# Patient Record
Sex: FEMALE | Race: WHITE | NOT HISPANIC OR LATINO | Employment: OTHER | URBAN - NONMETROPOLITAN AREA
[De-identification: names, ages, dates, MRNs, and addresses within clinical notes are randomized per-mention and may not be internally consistent; named-entity substitution may affect disease eponyms.]

---

## 2017-01-30 ENCOUNTER — TRANSCRIBE ORDERS (OUTPATIENT)
Dept: ADMINISTRATIVE | Facility: HOSPITAL | Age: 72
End: 2017-01-30

## 2017-01-30 ENCOUNTER — APPOINTMENT (OUTPATIENT)
Dept: LAB | Facility: HOSPITAL | Age: 72
End: 2017-01-30
Attending: DERMATOLOGY

## 2017-01-30 DIAGNOSIS — B35.1 TINEA UNGUIUM: ICD-10-CM

## 2017-01-30 DIAGNOSIS — L73.8 OTHER SPECIFIED FOLLICULAR DISORDERS: Primary | ICD-10-CM

## 2017-01-30 LAB — KOH PREP NAIL: NORMAL

## 2017-01-30 PROCEDURE — 87220 TISSUE EXAM FOR FUNGI: CPT | Performed by: DERMATOLOGY

## 2017-01-30 PROCEDURE — 87102 FUNGUS ISOLATION CULTURE: CPT | Performed by: DERMATOLOGY

## 2017-03-12 LAB — FUNGUS WND CULT: NORMAL

## 2018-09-27 ENCOUNTER — TRANSCRIBE ORDERS (OUTPATIENT)
Dept: ADMINISTRATIVE | Facility: HOSPITAL | Age: 73
End: 2018-09-27

## 2018-09-27 DIAGNOSIS — I48.0 PAROXYSMAL ATRIAL FIBRILLATION (HCC): Primary | ICD-10-CM

## 2018-10-04 ENCOUNTER — HOSPITAL ENCOUNTER (OUTPATIENT)
Dept: CARDIOLOGY | Facility: HOSPITAL | Age: 73
Discharge: HOME | End: 2018-10-04
Attending: INTERNAL MEDICINE | Admitting: INTERNAL MEDICINE

## 2018-10-04 VITALS — BODY MASS INDEX: 25.07 KG/M2 | WEIGHT: 156 LBS | HEIGHT: 66 IN

## 2018-10-04 DIAGNOSIS — I48.0 PAROXYSMAL ATRIAL FIBRILLATION (HCC): ICD-10-CM

## 2018-10-04 LAB
BH CV ECHO MEAS - AO MAX PG (FULL): 1.2 MMHG
BH CV ECHO MEAS - AO MAX PG: 6.7 MMHG
BH CV ECHO MEAS - AO MEAN PG (FULL): 1 MMHG
BH CV ECHO MEAS - AO MEAN PG: 4 MMHG
BH CV ECHO MEAS - AO ROOT AREA (BSA CORRECTED): 1.9
BH CV ECHO MEAS - AO ROOT AREA: 9.1 CM^2
BH CV ECHO MEAS - AO ROOT DIAM: 3.4 CM
BH CV ECHO MEAS - AO V2 MAX: 129 CM/SEC
BH CV ECHO MEAS - AO V2 MEAN: 92.5 CM/SEC
BH CV ECHO MEAS - AO V2 VTI: 34.4 CM
BH CV ECHO MEAS - AVA(I,A): 3.2 CM^2
BH CV ECHO MEAS - AVA(I,D): 3.2 CM^2
BH CV ECHO MEAS - AVA(V,A): 3.4 CM^2
BH CV ECHO MEAS - AVA(V,D): 3.4 CM^2
BH CV ECHO MEAS - BSA(HAYCOCK): 1.8 M^2
BH CV ECHO MEAS - BSA: 1.8 M^2
BH CV ECHO MEAS - BZI_BMI: 25.2 KILOGRAMS/M^2
BH CV ECHO MEAS - BZI_METRIC_HEIGHT: 167.6 CM
BH CV ECHO MEAS - BZI_METRIC_WEIGHT: 70.8 KG
BH CV ECHO MEAS - EDV(CUBED): 46.3 ML
BH CV ECHO MEAS - EDV(MOD-SP4): 58.1 ML
BH CV ECHO MEAS - EDV(TEICH): 54.1 ML
BH CV ECHO MEAS - EF(CUBED): 60.2 %
BH CV ECHO MEAS - EF(MOD-SP4): 62.1 %
BH CV ECHO MEAS - EF(TEICH): 52.7 %
BH CV ECHO MEAS - ESV(CUBED): 18.4 ML
BH CV ECHO MEAS - ESV(MOD-SP4): 22 ML
BH CV ECHO MEAS - ESV(TEICH): 25.6 ML
BH CV ECHO MEAS - FS: 26.5 %
BH CV ECHO MEAS - IVS/LVPW: 0.97
BH CV ECHO MEAS - IVSD: 1.4 CM
BH CV ECHO MEAS - LA DIMENSION: 3.6 CM
BH CV ECHO MEAS - LA/AO: 1.1
BH CV ECHO MEAS - LAT PEAK E' VEL: 8.8 CM/SEC
BH CV ECHO MEAS - LV DIASTOLIC VOL/BSA (35-75): 32.3 ML/M^2
BH CV ECHO MEAS - LV MASS(C)D: 177.2 GRAMS
BH CV ECHO MEAS - LV MASS(C)DI: 98.4 GRAMS/M^2
BH CV ECHO MEAS - LV MAX PG: 5.5 MMHG
BH CV ECHO MEAS - LV MEAN PG: 3 MMHG
BH CV ECHO MEAS - LV SYSTOLIC VOL/BSA (12-30): 12.2 ML/M^2
BH CV ECHO MEAS - LV V1 MAX: 117 CM/SEC
BH CV ECHO MEAS - LV V1 MEAN: 83.2 CM/SEC
BH CV ECHO MEAS - LV V1 VTI: 28.7 CM
BH CV ECHO MEAS - LVIDD: 3.6 CM
BH CV ECHO MEAS - LVIDS: 2.6 CM
BH CV ECHO MEAS - LVLD AP4: 7.6 CM
BH CV ECHO MEAS - LVLS AP4: 6 CM
BH CV ECHO MEAS - LVOT AREA (M): 3.8 CM^2
BH CV ECHO MEAS - LVOT AREA: 3.8 CM^2
BH CV ECHO MEAS - LVOT DIAM: 2.2 CM
BH CV ECHO MEAS - LVPWD: 1.4 CM
BH CV ECHO MEAS - MED PEAK E' VEL: 8.1 CM/SEC
BH CV ECHO MEAS - MV A MAX VEL: 54.8 CM/SEC
BH CV ECHO MEAS - MV DEC TIME: 0.23 SEC
BH CV ECHO MEAS - MV E MAX VEL: 83.4 CM/SEC
BH CV ECHO MEAS - MV E/A: 1.5
BH CV ECHO MEAS - RAP SYSTOLE: 10 MMHG
BH CV ECHO MEAS - RVSP: 40.3 MMHG
BH CV ECHO MEAS - SI(AO): 173.5 ML/M^2
BH CV ECHO MEAS - SI(CUBED): 15.5 ML/M^2
BH CV ECHO MEAS - SI(LVOT): 60.6 ML/M^2
BH CV ECHO MEAS - SI(MOD-SP4): 20.1 ML/M^2
BH CV ECHO MEAS - SI(TEICH): 15.8 ML/M^2
BH CV ECHO MEAS - SV(AO): 312.3 ML
BH CV ECHO MEAS - SV(CUBED): 27.9 ML
BH CV ECHO MEAS - SV(LVOT): 109.1 ML
BH CV ECHO MEAS - SV(MOD-SP4): 36.1 ML
BH CV ECHO MEAS - SV(TEICH): 28.5 ML
BH CV ECHO MEAS - TR MAX VEL: 275 CM/SEC
BH CV ECHO MEASUREMENTS AVERAGE E/E' RATIO: 9.87
LEFT ATRIUM VOLUME INDEX: 23 ML/M2
LEFT ATRIUM VOLUME: 41.4 CM3

## 2018-10-04 PROCEDURE — 93306 TTE W/DOPPLER COMPLETE: CPT

## 2018-10-04 PROCEDURE — 93306 TTE W/DOPPLER COMPLETE: CPT | Performed by: INTERNAL MEDICINE

## 2018-10-27 ENCOUNTER — HOSPITAL ENCOUNTER (EMERGENCY)
Facility: HOSPITAL | Age: 73
Discharge: HOME | End: 2018-10-27
Attending: EMERGENCY MEDICINE | Admitting: EMERGENCY MEDICINE

## 2018-10-27 ENCOUNTER — NURSE TRIAGE (OUTPATIENT)
Dept: CALL CENTER | Facility: HOSPITAL | Age: 73
End: 2018-10-27

## 2018-10-27 VITALS
HEIGHT: 66 IN | BODY MASS INDEX: 24.91 KG/M2 | SYSTOLIC BLOOD PRESSURE: 122 MMHG | DIASTOLIC BLOOD PRESSURE: 72 MMHG | WEIGHT: 155 LBS | OXYGEN SATURATION: 97 % | HEART RATE: 86 BPM | RESPIRATION RATE: 18 BRPM | TEMPERATURE: 97.8 F

## 2018-10-27 DIAGNOSIS — I48.91 ATRIAL FIBRILLATION, UNSPECIFIED TYPE (HCC): Primary | ICD-10-CM

## 2018-10-27 LAB
ANION GAP SERPL CALCULATED.3IONS-SCNC: 11 MMOL/L (ref 4–13)
APTT PPP: 47.9 SECONDS (ref 24.1–34.8)
BASOPHILS # BLD AUTO: 0.05 10*3/MM3 (ref 0–0.2)
BASOPHILS NFR BLD AUTO: 0.7 % (ref 0–2)
BUN BLD-MCNC: 25 MG/DL (ref 5–21)
BUN/CREAT SERPL: 30.9 (ref 7–25)
CALCIUM SPEC-SCNC: 9.6 MG/DL (ref 8.4–10.4)
CHLORIDE SERPL-SCNC: 104 MMOL/L (ref 98–110)
CK SERPL-CCNC: 35 U/L (ref 0–203)
CO2 SERPL-SCNC: 26 MMOL/L (ref 24–31)
CREAT BLD-MCNC: 0.81 MG/DL (ref 0.5–1.4)
DEPRECATED RDW RBC AUTO: 39 FL (ref 40–54)
EOSINOPHIL # BLD AUTO: 0.11 10*3/MM3 (ref 0–0.7)
EOSINOPHIL NFR BLD AUTO: 1.4 % (ref 0–4)
ERYTHROCYTE [DISTWIDTH] IN BLOOD BY AUTOMATED COUNT: 12.2 % (ref 12–15)
GFR SERPL CREATININE-BSD FRML MDRD: 70 ML/MIN/1.73
GLUCOSE BLD-MCNC: 107 MG/DL (ref 70–100)
HCT VFR BLD AUTO: 45.9 % (ref 37–47)
HGB BLD-MCNC: 15.7 G/DL (ref 12–16)
IMM GRANULOCYTES # BLD: 0.02 10*3/MM3 (ref 0–0.03)
IMM GRANULOCYTES NFR BLD: 0.3 % (ref 0–5)
INR PPP: 2.13 (ref 0.91–1.09)
LYMPHOCYTES # BLD AUTO: 2.28 10*3/MM3 (ref 0.72–4.86)
LYMPHOCYTES NFR BLD AUTO: 29.7 % (ref 15–45)
MAGNESIUM SERPL-MCNC: 2.3 MG/DL (ref 1.4–2.2)
MCH RBC QN AUTO: 29.7 PG (ref 28–32)
MCHC RBC AUTO-ENTMCNC: 34.2 G/DL (ref 33–36)
MCV RBC AUTO: 86.9 FL (ref 82–98)
MONOCYTES # BLD AUTO: 0.68 10*3/MM3 (ref 0.19–1.3)
MONOCYTES NFR BLD AUTO: 8.9 % (ref 4–12)
NEUTROPHILS # BLD AUTO: 4.53 10*3/MM3 (ref 1.87–8.4)
NEUTROPHILS NFR BLD AUTO: 59 % (ref 39–78)
NRBC BLD MANUAL-RTO: 0 /100 WBC (ref 0–0)
NT-PROBNP SERPL-MCNC: 2900 PG/ML (ref 0–900)
PHOSPHATE SERPL-MCNC: 3.4 MG/DL (ref 2.5–4.5)
PLATELET # BLD AUTO: 211 10*3/MM3 (ref 130–400)
PMV BLD AUTO: 11.4 FL (ref 6–12)
POTASSIUM BLD-SCNC: 4.1 MMOL/L (ref 3.5–5.3)
PROTHROMBIN TIME: 24.6 SECONDS (ref 11.9–14.6)
RBC # BLD AUTO: 5.28 10*6/MM3 (ref 4.2–5.4)
SODIUM BLD-SCNC: 141 MMOL/L (ref 135–145)
TROPONIN I SERPL-MCNC: <0.012 NG/ML (ref 0–0.03)
WBC NRBC COR # BLD: 7.67 10*3/MM3 (ref 4.8–10.8)

## 2018-10-27 PROCEDURE — 99284 EMERGENCY DEPT VISIT MOD MDM: CPT

## 2018-10-27 PROCEDURE — 80048 BASIC METABOLIC PNL TOTAL CA: CPT | Performed by: EMERGENCY MEDICINE

## 2018-10-27 PROCEDURE — 82550 ASSAY OF CK (CPK): CPT | Performed by: EMERGENCY MEDICINE

## 2018-10-27 PROCEDURE — 93005 ELECTROCARDIOGRAM TRACING: CPT | Performed by: EMERGENCY MEDICINE

## 2018-10-27 PROCEDURE — 96374 THER/PROPH/DIAG INJ IV PUSH: CPT

## 2018-10-27 PROCEDURE — 85610 PROTHROMBIN TIME: CPT | Performed by: EMERGENCY MEDICINE

## 2018-10-27 PROCEDURE — 83735 ASSAY OF MAGNESIUM: CPT | Performed by: EMERGENCY MEDICINE

## 2018-10-27 PROCEDURE — 93005 ELECTROCARDIOGRAM TRACING: CPT

## 2018-10-27 PROCEDURE — 93010 ELECTROCARDIOGRAM REPORT: CPT | Performed by: INTERNAL MEDICINE

## 2018-10-27 PROCEDURE — 85025 COMPLETE CBC W/AUTO DIFF WBC: CPT | Performed by: EMERGENCY MEDICINE

## 2018-10-27 PROCEDURE — 83880 ASSAY OF NATRIURETIC PEPTIDE: CPT | Performed by: EMERGENCY MEDICINE

## 2018-10-27 PROCEDURE — 85730 THROMBOPLASTIN TIME PARTIAL: CPT | Performed by: EMERGENCY MEDICINE

## 2018-10-27 PROCEDURE — 84484 ASSAY OF TROPONIN QUANT: CPT | Performed by: EMERGENCY MEDICINE

## 2018-10-27 PROCEDURE — 84100 ASSAY OF PHOSPHORUS: CPT | Performed by: EMERGENCY MEDICINE

## 2018-10-27 RX ORDER — DILTIAZEM HYDROCHLORIDE 5 MG/ML
20 INJECTION INTRAVENOUS ONCE
Status: COMPLETED | OUTPATIENT
Start: 2018-10-27 | End: 2018-10-27

## 2018-10-27 RX ORDER — LISINOPRIL 20 MG/1
20 TABLET ORAL DAILY
COMMUNITY

## 2018-10-27 RX ORDER — OXYBUTYNIN CHLORIDE 5 MG/1
5 TABLET ORAL 3 TIMES DAILY
COMMUNITY
End: 2019-04-25

## 2018-10-27 RX ORDER — PROPAFENONE HYDROCHLORIDE 150 MG/1
150 TABLET, COATED ORAL DAILY PRN
COMMUNITY
End: 2019-04-25 | Stop reason: ALTCHOICE

## 2018-10-27 RX ORDER — METOPROLOL TARTRATE 50 MG/1
50 TABLET, FILM COATED ORAL 2 TIMES DAILY
COMMUNITY
End: 2019-04-25 | Stop reason: ALTCHOICE

## 2018-10-27 RX ADMIN — DILTIAZEM HYDROCHLORIDE 20 MG: 5 INJECTION INTRAVENOUS at 20:53

## 2018-10-27 NOTE — TELEPHONE ENCOUNTER
"Vinny has had rapid heart rate all day. It has went from 120-140 all day. She is now at 145. She is having some shortness of air at times. What should I do? She does have a history of A-fib.     Reason for Disposition  • [1] Heart beating very rapidly (e.g., > 140 / minute) AND [2] present now  (Exception: during exercise)    Additional Information  • Negative: Passed out (i.e., lost consciousness, collapsed and was not responding)  • Negative: Shock suspected (e.g., cold/pale/clammy skin, too weak to stand, low BP, rapid pulse)  • Negative: Difficult to awaken or acting confused (e.g., disoriented, slurred speech)  • Negative: Visible sweat on face or sweat dripping down face  • Negative: Unable to walk, or can only walk with assistance (e.g., requires support)  • Negative: [1] Received SHOCK from implantable cardiac defibrillator AND [2] persisting symptoms (i.e., palpitations, lightheadedness)  • Negative: Sounds like a life-threatening emergency to the triager  • Negative: Dizziness, lightheadedness, or weakness  • Negative: Chest pain  • Negative: Difficulty breathing    Answer Assessment - Initial Assessment Questions  1. DESCRIPTION: \"Please describe your heart rate or heart beat that you are having\" (e.g., fast/slow, regular/irregular, skipped or extra beats, \"palpitations\")      Beating really fast  2. ONSET: \"When did it start?\" (Minutes, hours or days)       All day  3. DURATION: \"How long does it last\" (e.g., seconds, minutes, hours)      All day  4. PATTERN \"Does it come and go, or has it been constant since it started?\"  \"Does it get worse with exertion?\"   \"Are you feeling it now?\"      constant  5. TAP: \"Using your hand, can you tap out what you are feeling on a chair or table in front of you, so that I can hear?\" (Note: not all patients can do this)        Very fast  6. HEART RATE: \"Can you tell me your heart rate?\" \"How many beats in 15 seconds?\"  (Note: not all patients can do this)        Total " "of 145 right now  7. RECURRENT SYMPTOM: \"Have you ever had this before?\" If so, ask: \"When was the last time?\" and \"What happened that time?\"       Has history of a-fib with ablation  8. CAUSE: \"What do you think is causing the palpitations?\"      Possible a-fiv  9. CARDIAC HISTORY: \"Do you have any history of heart disease?\" (e.g., heart attack, angina, bypass surgery, angioplasty, arrhythmia)       abib  10. OTHER SYMPTOMS: \"Do you have any other symptoms?\" (e.g., dizziness, chest pain, sweating, difficulty breathing)        Some SOA noted  11. PREGNANCY: \"Is there any chance you are pregnant?\" \"When was your last menstrual period?\"        No    Protocols used: HEART RATE AND HEARTBEAT QUESTIONS-ADULT-AH      "

## 2018-12-25 ENCOUNTER — HOSPITAL ENCOUNTER (EMERGENCY)
Facility: HOSPITAL | Age: 73
Discharge: HOME | End: 2018-12-25
Attending: EMERGENCY MEDICINE | Admitting: EMERGENCY MEDICINE

## 2018-12-25 VITALS
TEMPERATURE: 98.3 F | BODY MASS INDEX: 24.91 KG/M2 | OXYGEN SATURATION: 96 % | WEIGHT: 155 LBS | HEIGHT: 66 IN | HEART RATE: 87 BPM | SYSTOLIC BLOOD PRESSURE: 111 MMHG | RESPIRATION RATE: 15 BRPM | DIASTOLIC BLOOD PRESSURE: 70 MMHG

## 2018-12-25 DIAGNOSIS — I48.91 ATRIAL FIBRILLATION, UNSPECIFIED TYPE (HCC): Primary | ICD-10-CM

## 2018-12-25 LAB
HOLD SPECIMEN: NORMAL
HOLD SPECIMEN: NORMAL
TROPONIN I SERPL-MCNC: <0.012 NG/ML (ref 0–0.03)
WHOLE BLOOD HOLD SPECIMEN: NORMAL
WHOLE BLOOD HOLD SPECIMEN: NORMAL

## 2018-12-25 PROCEDURE — 99284 EMERGENCY DEPT VISIT MOD MDM: CPT

## 2018-12-25 PROCEDURE — 93010 ELECTROCARDIOGRAM REPORT: CPT | Performed by: INTERNAL MEDICINE

## 2018-12-25 PROCEDURE — 93005 ELECTROCARDIOGRAM TRACING: CPT | Performed by: EMERGENCY MEDICINE

## 2018-12-25 PROCEDURE — 84484 ASSAY OF TROPONIN QUANT: CPT | Performed by: EMERGENCY MEDICINE

## 2019-02-04 ENCOUNTER — NURSE TRIAGE (OUTPATIENT)
Dept: CALL CENTER | Facility: HOSPITAL | Age: 74
End: 2019-02-04

## 2019-02-05 ENCOUNTER — HOSPITAL ENCOUNTER (EMERGENCY)
Facility: HOSPITAL | Age: 74
Discharge: HOME | End: 2019-02-05
Attending: EMERGENCY MEDICINE | Admitting: EMERGENCY MEDICINE

## 2019-02-05 ENCOUNTER — APPOINTMENT (OUTPATIENT)
Dept: GENERAL RADIOLOGY | Facility: HOSPITAL | Age: 74
End: 2019-02-05

## 2019-02-05 VITALS
BODY MASS INDEX: 24.11 KG/M2 | TEMPERATURE: 98.6 F | DIASTOLIC BLOOD PRESSURE: 62 MMHG | WEIGHT: 150 LBS | OXYGEN SATURATION: 99 % | HEART RATE: 54 BPM | SYSTOLIC BLOOD PRESSURE: 107 MMHG | RESPIRATION RATE: 15 BRPM | HEIGHT: 66 IN

## 2019-02-05 DIAGNOSIS — R00.2 PALPITATIONS: Primary | ICD-10-CM

## 2019-02-05 DIAGNOSIS — I48.91 ATRIAL FIBRILLATION, UNSPECIFIED TYPE (HCC): ICD-10-CM

## 2019-02-05 LAB
ANION GAP SERPL CALCULATED.3IONS-SCNC: 11 MMOL/L (ref 4–13)
APTT PPP: 49.3 SECONDS (ref 24.1–34.8)
BASOPHILS # BLD AUTO: 0.06 10*3/MM3 (ref 0–0.2)
BASOPHILS NFR BLD AUTO: 0.8 % (ref 0–2)
BUN BLD-MCNC: 24 MG/DL (ref 5–21)
BUN/CREAT SERPL: 38.7 (ref 7–25)
CALCIUM SPEC-SCNC: 9.8 MG/DL (ref 8.4–10.4)
CHLORIDE SERPL-SCNC: 104 MMOL/L (ref 98–110)
CO2 SERPL-SCNC: 25 MMOL/L (ref 24–31)
CREAT BLD-MCNC: 0.62 MG/DL (ref 0.5–1.4)
DEPRECATED RDW RBC AUTO: 42.3 FL (ref 40–54)
EOSINOPHIL # BLD AUTO: 0.22 10*3/MM3 (ref 0–0.7)
EOSINOPHIL NFR BLD AUTO: 3 % (ref 0–4)
ERYTHROCYTE [DISTWIDTH] IN BLOOD BY AUTOMATED COUNT: 13.2 % (ref 12–15)
GFR SERPL CREATININE-BSD FRML MDRD: 95 ML/MIN/1.73
GLUCOSE BLD-MCNC: 107 MG/DL (ref 70–100)
HCT VFR BLD AUTO: 42.9 % (ref 37–47)
HGB BLD-MCNC: 14.6 G/DL (ref 12–16)
HOLD SPECIMEN: NORMAL
HOLD SPECIMEN: NORMAL
IMM GRANULOCYTES # BLD AUTO: 0.01 10*3/MM3 (ref 0–0.03)
IMM GRANULOCYTES NFR BLD AUTO: 0.1 % (ref 0–5)
INR PPP: 1.83 (ref 0.91–1.09)
LYMPHOCYTES # BLD AUTO: 2.03 10*3/MM3 (ref 0.72–4.86)
LYMPHOCYTES NFR BLD AUTO: 28 % (ref 15–45)
MAGNESIUM SERPL-MCNC: 2.1 MG/DL (ref 1.4–2.2)
MCH RBC QN AUTO: 29.9 PG (ref 28–32)
MCHC RBC AUTO-ENTMCNC: 34 G/DL (ref 33–36)
MCV RBC AUTO: 87.7 FL (ref 82–98)
MONOCYTES # BLD AUTO: 0.53 10*3/MM3 (ref 0.19–1.3)
MONOCYTES NFR BLD AUTO: 7.3 % (ref 4–12)
NEUTROPHILS # BLD AUTO: 4.4 10*3/MM3 (ref 1.87–8.4)
NEUTROPHILS NFR BLD AUTO: 60.8 % (ref 39–78)
NRBC BLD AUTO-RTO: 0 /100 WBC (ref 0–0)
NT-PROBNP SERPL-MCNC: 421 PG/ML (ref 0–900)
PLATELET # BLD AUTO: 200 10*3/MM3 (ref 130–400)
PMV BLD AUTO: 11.6 FL (ref 6–12)
POTASSIUM BLD-SCNC: 3.9 MMOL/L (ref 3.5–5.3)
PROTHROMBIN TIME: 21.8 SECONDS (ref 11.9–14.6)
RBC # BLD AUTO: 4.89 10*6/MM3 (ref 4.2–5.4)
SODIUM BLD-SCNC: 140 MMOL/L (ref 135–145)
T4 FREE SERPL-MCNC: 1.67 NG/DL (ref 0.78–2.19)
TROPONIN I SERPL-MCNC: <0.012 NG/ML (ref 0–0.03)
TROPONIN I SERPL-MCNC: <0.012 NG/ML (ref 0–0.03)
TSH SERPL DL<=0.05 MIU/L-ACNC: 2.23 MIU/ML (ref 0.47–4.68)
WBC NRBC COR # BLD: 7.25 10*3/MM3 (ref 4.8–10.8)
WHOLE BLOOD HOLD SPECIMEN: NORMAL
WHOLE BLOOD HOLD SPECIMEN: NORMAL

## 2019-02-05 PROCEDURE — 84439 ASSAY OF FREE THYROXINE: CPT | Performed by: EMERGENCY MEDICINE

## 2019-02-05 PROCEDURE — 93010 ELECTROCARDIOGRAM REPORT: CPT | Performed by: INTERNAL MEDICINE

## 2019-02-05 PROCEDURE — 71045 X-RAY EXAM CHEST 1 VIEW: CPT

## 2019-02-05 PROCEDURE — 83880 ASSAY OF NATRIURETIC PEPTIDE: CPT | Performed by: EMERGENCY MEDICINE

## 2019-02-05 PROCEDURE — 80048 BASIC METABOLIC PNL TOTAL CA: CPT | Performed by: EMERGENCY MEDICINE

## 2019-02-05 PROCEDURE — 93005 ELECTROCARDIOGRAM TRACING: CPT | Performed by: EMERGENCY MEDICINE

## 2019-02-05 PROCEDURE — 83735 ASSAY OF MAGNESIUM: CPT | Performed by: EMERGENCY MEDICINE

## 2019-02-05 PROCEDURE — 99284 EMERGENCY DEPT VISIT MOD MDM: CPT

## 2019-02-05 PROCEDURE — 96360 HYDRATION IV INFUSION INIT: CPT

## 2019-02-05 PROCEDURE — 85730 THROMBOPLASTIN TIME PARTIAL: CPT | Performed by: EMERGENCY MEDICINE

## 2019-02-05 PROCEDURE — 85025 COMPLETE CBC W/AUTO DIFF WBC: CPT | Performed by: EMERGENCY MEDICINE

## 2019-02-05 PROCEDURE — 84443 ASSAY THYROID STIM HORMONE: CPT | Performed by: EMERGENCY MEDICINE

## 2019-02-05 PROCEDURE — 85610 PROTHROMBIN TIME: CPT | Performed by: EMERGENCY MEDICINE

## 2019-02-05 PROCEDURE — 84484 ASSAY OF TROPONIN QUANT: CPT | Performed by: EMERGENCY MEDICINE

## 2019-02-05 RX ORDER — DILTIAZEM HYDROCHLORIDE 120 MG/1
120 TABLET, FILM COATED ORAL EVERY 4 HOURS PRN
COMMUNITY
End: 2019-10-30

## 2019-02-05 RX ORDER — SODIUM CHLORIDE 0.9 % (FLUSH) 0.9 %
10 SYRINGE (ML) INJECTION AS NEEDED
Status: DISCONTINUED | OUTPATIENT
Start: 2019-02-05 | End: 2019-02-05 | Stop reason: HOSPADM

## 2019-02-05 RX ORDER — METOPROLOL TARTRATE 5 MG/5ML
5 INJECTION INTRAVENOUS ONCE
Status: DISCONTINUED | OUTPATIENT
Start: 2019-02-05 | End: 2019-02-05 | Stop reason: HOSPADM

## 2019-02-05 RX ADMIN — SODIUM CHLORIDE 500 ML: 0.9 INJECTION, SOLUTION INTRAVENOUS at 02:30

## 2019-02-05 NOTE — TELEPHONE ENCOUNTER
"    Reason for Disposition  • [1] Systolic BP  >= 160 OR Diastolic >= 100 AND [2] cardiac or neurologic symptoms (e.g., chest pain, difficulty breathing, unsteady gait, blurred vision)    Additional Information  • Negative: Difficult to awaken or acting confused (e.g., disoriented, slurred speech)  • Negative: Severe difficulty breathing (e.g., struggling for each breath, speaks in single words)  • Negative: [1] Weakness of the face, arm or leg on one side of the body AND [2] new onset  • Negative: [1] Numbness (i.e., loss of sensation) of the face, arm or leg on one side of the body AND [2] new onset  • Negative: [1] Chest pain lasts > 5 minutes AND [2] history of heart disease  (i.e., heart attack, bypass surgery, angina, angioplasty, CHF)  • Negative: [1] Chest pain AND [2] took nitrogylcerin AND [3] pain was not relieved  • Negative: Sounds like a life-threatening emergency to the triager  • Negative: Symptom is main concern  (e.g., headache, chest pain)  • Negative: Low blood pressure is main concern  • Negative: [1] Pregnant > 20 weeks AND [2] new hand or face swelling  • Negative: [1] Pregnant > 20 weeks AND [2] BP Systolic BP  >= 140 OR Diastolic >= 90    Answer Assessment - Initial Assessment Questions  1. BLOOD PRESSURE: \"What is the blood pressure?\" \"Did you take at least two measurements 5 minutes apart?\"      119/102 with   2. ONSET: \"When did you take your blood pressure?\"      15 minutes ago  3. HOW: \"How did you obtain the blood pressure?\" (e.g., visiting nurse, automatic home BP monitor)      Automatic BP machine  4. HISTORY: \"Do you have a history of high blood pressure?\"      yes  5. MEDICATIONS: \"Are you taking any medications for blood pressure?\" \"Have you missed any doses recently?\"      propanolol  6. OTHER SYMPTOMS: \"Do you have any symptoms?\" (e.g., headache, chest pain, blurred vision, difficulty breathing, weakness)      headache  7. PREGNANCY: \"Is there any chance you are pregnant?\" " "\"When was your last menstrual period?\"      na    Protocols used: HIGH BLOOD PRESSURE-ADULT-AH      "

## 2019-02-05 NOTE — DISCHARGE INSTRUCTIONS
Please read and follow all directions.  Tylenol or ibuprofen for discomfort as needed.  Take all home medications as previously prescribed.  Please contact your primary care provider and Dr. Ahmadi's office in the morning to arrange for outpatient follow-up.  Return to the emergency department sooner if symptoms worsen or for any additional concerns.

## 2019-02-05 NOTE — ED PROVIDER NOTES
Subjective   This is a 73-year-old female who presents to the emergency department for evaluation of tachycardia.  Patient has a history of atrial fibrillation.  In the past she had seen Dr. Decker but was referred to Dr. Quinteros.  She is status post ablation.  She takes daily lisinopril and metoprolol.  She indicates that she is prescribed propafenone if her heart goes out of rhythm and Cardizem if her heart rate goes above 120.  Around 11:30 PM she started noting palpitations with associated shortness of breath, took her pulse and found to be elevated, and so took the propafenone.  Blood pressure was also elevated in the 160s so she has presented for evaluation.  Denies any recent illness or fever.  No cough or wheezing.  No chest pain or near-syncope.  No GI/ complaints.  No lower extremity swelling.  She is anticoagulated on Xarelto.  Review of systems otherwise negative.  She has no additional concerns.            Review of Systems   All other systems reviewed and are negative.      Past Medical History:   Diagnosis Date   • A-fib (CMS/HCC)    • Hypertension        Allergies   Allergen Reactions   • Latex Hives   • Sulfa Antibiotics Itching       Past Surgical History:   Procedure Laterality Date   • APPENDECTOMY     • BLADDER SURGERY     • EYE SURGERY     • FOOT FRACTURE SURGERY     • HYSTERECTOMY         History reviewed. No pertinent family history.    Social History     Socioeconomic History   • Marital status:      Spouse name: Not on file   • Number of children: Not on file   • Years of education: Not on file   • Highest education level: Not on file   Tobacco Use   • Smoking status: Never Smoker   • Smokeless tobacco: Never Used   Substance and Sexual Activity   • Alcohol use: No   • Drug use: No   • Sexual activity: Defer           Objective   Physical Exam   Constitutional: She appears well-developed and well-nourished.   Eyes: EOM are normal. Pupils are equal, round, and reactive to light.    Neck: Normal range of motion. Neck supple. No JVD present. No tracheal deviation present.   Cardiovascular: Normal rate and normal heart sounds.   Irregular rhythm   Pulmonary/Chest: Effort normal and breath sounds normal. No respiratory distress. She has no wheezes. She exhibits no tenderness.   Abdominal: Soft. Bowel sounds are normal. She exhibits no distension. There is no tenderness. There is no guarding.   Musculoskeletal: She exhibits no edema, tenderness or deformity.   Neurological: She is alert.   Skin: Skin is warm and dry. Capillary refill takes less than 2 seconds.   Psychiatric: She has a normal mood and affect. Her behavior is normal.   Nursing note and vitals reviewed.      Procedures           ED Course      EKG at 12:43 AM shows atrial fibrillation with a rate of 113 bpm.  Intervals within normal limits.  Poor R-wave progression.  No T wave inversion.  No ST elevation or evidence for acute infarction.    5mg IV Lopressor and a small fluid bolus ordered    Labs reviewed    X-ray chest as interpreted by myself shows no acute cardiopulmonary process    Patient updated  No new symptoms  Chest pain free  Heart rate variable in the 80-90 range   Stable blood pressure    While waiting for a second troponin her rhythm changed to a sinus bradycardia    Repeat EKG shows a sinus bradycardia with a first-degree AV block.  Rate of 56 bpm.  Normal axis.  Poor R-wave progression.  No T wave inversion.  No ST segment depression/elevation or evidence for acute ischemia/infarction.    Second troponin negative    Patient again updated  Remains in a sinus bradycardia   No new symptoms  Stable blood pressure   She is comfortable with the plan of care for discharge            MDM  Initially Lopressor was ordered but was ultimately held as her heart rate has been consistently in the 80s to 90s  Labs and x-ray benign    Patient with relatively rate controlled atrial fibrillation  Patient spontaneously converted to a  sinus rhythm   She is currently asymptomatic with a stable blood pressure  Troponins negative ×2  She is comfortable with the plan of care for discharge  She is requesting contact information for Dr. Ahmadi indicating that she previously was supposed to see him but no arrangements or appointemtns were ever made      Final diagnoses:   Palpitations   Atrial fibrillation, unspecified type (CMS/[facility])            Shashank Gallo, DO  02/05/19 0454

## 2019-04-25 ENCOUNTER — OFFICE VISIT (OUTPATIENT)
Dept: CARDIOLOGY | Facility: CLINIC | Age: 74
End: 2019-04-25

## 2019-04-25 VITALS
DIASTOLIC BLOOD PRESSURE: 84 MMHG | HEART RATE: 64 BPM | HEIGHT: 66 IN | BODY MASS INDEX: 25.55 KG/M2 | SYSTOLIC BLOOD PRESSURE: 140 MMHG | WEIGHT: 159 LBS | OXYGEN SATURATION: 98 %

## 2019-04-25 DIAGNOSIS — I10 ESSENTIAL HYPERTENSION: ICD-10-CM

## 2019-04-25 DIAGNOSIS — I48.0 PAROXYSMAL ATRIAL FIBRILLATION (HCC): Primary | ICD-10-CM

## 2019-04-25 PROBLEM — I48.91 A-FIB (HCC): Status: ACTIVE | Noted: 2019-04-25

## 2019-04-25 PROCEDURE — 99204 OFFICE O/P NEW MOD 45 MIN: CPT | Performed by: INTERNAL MEDICINE

## 2019-04-25 RX ORDER — SOTALOL HYDROCHLORIDE 80 MG/1
80 TABLET ORAL 2 TIMES DAILY
COMMUNITY

## 2019-04-25 RX ORDER — VITAMIN E 268 MG
400 CAPSULE ORAL DAILY
COMMUNITY

## 2019-04-25 NOTE — PROGRESS NOTES
Subjective:     Encounter Date:04/25/2019      Patient ID: Vane Pyle is a 73 y.o. female with paroxysmal atrial fibrillation, status post ablation about 4 years ago and recently an ablation by Dr. Quinteros, hypertension, presenting today to reestablish care in our office.    Referring Provider: Harjeet Pedraza DO  Reason for Referral: PAF    Chief Complaint: Establish care    Atrial Fibrillation   Presents for initial visit. Symptoms include palpitations. Symptoms are negative for chest pain, dizziness, shortness of breath and syncope. The symptoms have been stable. Past treatments include antiarrhythmics and anticoagulant. Past medical history includes atrial fibrillation and HTN.   Hypertension   This is a chronic problem. The problem is unchanged. The problem is controlled. Associated symptoms include palpitations. Pertinent negatives include no blurred vision, chest pain, headaches, neck pain, orthopnea, peripheral edema, PND or shortness of breath. There are no associated agents to hypertension. Risk factors for coronary artery disease include post-menopausal state. Current antihypertension treatment includes ACE inhibitors and calcium channel blockers. The current treatment provides significant improvement. There are no compliance problems.  There is no history of CAD/MI, CVA or heart failure.     This is a 73-year-old female with paroxysmal atrial fibrillation.  She had an ablation a number of years ago and had a recurrence of atrial fibrillation afterwards.  She has seen Dr. Quinteros and recently had a repeat atrial fibrillation ablation.  She has done well after this.  No obvious recurrence of atrial fibrillation.  She does remain on sotalol as well as Xarelto at this time.  She has not had any difficulties with these medications.  She reports that her blood pressure at home is been well controlled and she does bring an extensive log of blood pressure readings with her today showing most  blood pressure readings are in the 110's -130's range.  She denies any recurrent palpitations, lightheadedness, dizziness, syncope.  She denies orthopnea, PND, edema.    The following portions of the patient's history were reviewed and updated as appropriate: allergies, current medications, past family history, past medical history, past social history, past surgical history and problem list.     Past Medical History:   Diagnosis Date   • A-fib (CMS/HCC)    • Hypertension      Past Surgical History:   Procedure Laterality Date   • APPENDECTOMY     • BLADDER SURGERY     • EYE SURGERY     • FOOT FRACTURE SURGERY     • HYSTERECTOMY         Current Outpatient Medications:   •  B Complex Vitamins (B-COMPLEX/B-12 PO), Take  by mouth., Disp: , Rfl:   •  BIOTIN 5000 PO, Take  by mouth., Disp: , Rfl:   •  Cranberry 1000 MG capsule, Take  by mouth., Disp: , Rfl:   •  diltiaZEM (CARDIZEM) 120 MG tablet, Take 120 mg by mouth Every 4 (Four) Hours As Needed., Disp: , Rfl:   •  lisinopril (PRINIVIL,ZESTRIL) 20 MG tablet, Take 20 mg by mouth Daily., Disp: , Rfl:   •  Multiple Vitamins-Minerals (PRESERVISION AREDS 2 PO), Take  by mouth., Disp: , Rfl:   •  Multiple Vitamins-Minerals (ZINC PO), Take  by mouth., Disp: , Rfl:   •  Pediatric Multivit-Minerals-C (COMPLETE MULTI-VITAMIN PO), Take  by mouth., Disp: , Rfl:   •  Potassium 99 MG tablet, Take  by mouth., Disp: , Rfl:   •  rivaroxaban (XARELTO) 20 MG tablet, Take 20 mg by mouth Daily., Disp: , Rfl:   •  sotalol (BETAPACE) 80 MG tablet, Take 80 mg by mouth 2 (Two) Times a Day., Disp: , Rfl:   •  vitamin E 400 UNIT capsule, Take 400 Units by mouth Daily., Disp: , Rfl:     Allergies   Allergen Reactions   • Latex Hives   • Sulfa Antibiotics Itching     Social History     Tobacco Use   • Smoking status: Never Smoker   • Smokeless tobacco: Never Used   Substance Use Topics   • Alcohol use: No     Family History   Problem Relation Age of Onset   • Stroke Sister         Review of  Systems   Constitution: Negative for chills, fever, night sweats and weight loss.   HENT: Negative for congestion and hearing loss.    Eyes: Negative for blurred vision and pain.   Cardiovascular: Positive for palpitations. Negative for chest pain, claudication, dyspnea on exertion, irregular heartbeat, leg swelling, orthopnea, paroxysmal nocturnal dyspnea and syncope.   Respiratory: Negative for cough, hemoptysis, shortness of breath and wheezing.    Endocrine: Negative for cold intolerance, heat intolerance, polydipsia and polyuria.   Hematologic/Lymphatic: Negative for adenopathy and bleeding problem. Does not bruise/bleed easily.   Skin: Negative for color change, poor wound healing and rash.   Musculoskeletal: Negative for arthritis, back pain, joint pain, joint swelling, myalgias and neck pain.   Gastrointestinal: Negative for abdominal pain, change in bowel habit, constipation, diarrhea, heartburn, hematochezia, melena, nausea and vomiting.   Genitourinary: Negative for dysuria, frequency, hematuria and nocturia.   Neurological: Negative for dizziness, focal weakness, headaches, light-headedness, loss of balance and numbness.   Psychiatric/Behavioral: Negative for altered mental status, memory loss and substance abuse.   Allergic/Immunologic: Negative for hives and persistent infections.       Procedures: none today       Objective:     Physical Exam   Constitutional: She is oriented to person, place, and time. Vital signs are normal. She appears well-developed and well-nourished. She is cooperative.  Non-toxic appearance. No distress.   HENT:   Head: Normocephalic and atraumatic.   Right Ear: External ear normal.   Left Ear: External ear normal.   Nose: Nose normal.   Mouth/Throat: Uvula is midline, oropharynx is clear and moist and mucous membranes are normal. Mucous membranes are not pale, not dry and not cyanotic. No oropharyngeal exudate.   Eyes: EOM and lids are normal. Pupils are equal, round, and  "reactive to light.   Neck: Normal range of motion. Neck supple. No hepatojugular reflux and no JVD present. Carotid bruit is not present. No tracheal deviation and no edema present. No thyroid mass and no thyromegaly present.   Cardiovascular: Normal rate, regular rhythm, S1 normal, S2 normal, normal heart sounds, intact distal pulses and normal pulses.  No extrasystoles are present. PMI is not displaced. Exam reveals no gallop and no friction rub.   No murmur heard.  Pulses:       Radial pulses are 2+ on the right side, and 2+ on the left side.        Femoral pulses are 2+ on the right side, and 2+ on the left side.       Dorsalis pedis pulses are 2+ on the right side, and 2+ on the left side.        Posterior tibial pulses are 2+ on the right side, and 2+ on the left side.   Pulmonary/Chest: Effort normal and breath sounds normal. No accessory muscle usage. No respiratory distress. She has no wheezes. She has no rales. She exhibits no tenderness.   Abdominal: Soft. Normal appearance and bowel sounds are normal. She exhibits no distension, no abdominal bruit and no pulsatile midline mass. There is no hepatosplenomegaly. There is no tenderness.   Musculoskeletal: Normal range of motion. She exhibits no edema, tenderness or deformity.   Lymphadenopathy:     She has no cervical adenopathy.   Neurological: She is oriented to person, place, and time. She has normal strength. No cranial nerve deficit.   Skin: Skin is warm, dry and intact. No rash noted. She is not diaphoretic. No cyanosis or erythema. Nails show no clubbing.   Psychiatric: She has a normal mood and affect. Her speech is normal and behavior is normal. Thought content normal.   Vitals reviewed.    /84 (BP Location: Left arm, Patient Position: Sitting, Cuff Size: Adult)   Pulse 64   Ht 167.6 cm (66\")   Wt 72.1 kg (159 lb)   SpO2 98%   BMI 25.66 kg/m²     Data/Lab Review:     Echo 10/14/2018:  · Left ventricular systolic function is normal. " Estimated EF appears to be in the range of 56 - 60%.  · Left ventricular wall thickness is consistent with mild-to-moderate concentric hypertrophy.  · Normal left atrial size and volume noted.  · Trace aortic valve regurgitation is present.  · Trace tricuspid valve regurgitation is present. Estimated right ventricular systolic pressure from tricuspid regurgitation is mildly elevated (35-45 mmHg).      Assessment:          Diagnosis Plan   1. Paroxysmal atrial fibrillation (CMS/HCC)     2. Essential hypertension          Plan:       1.  Paroxysmal atrial fibrillation: The patient is stable at this time after her recent ablation.  She continues to follow closely with Dr. Quinteros.  She remains on sotalol as well as Xarelto at this time.  Continue current medications.    2.  Essential hypertension: The patient's blood pressure is well controlled on her home readings.  Continue current medications.    Patient's Body mass index is 25.66 kg/m². BMI is above normal parameters. Recommendations include: exercise counseling and nutrition counseling.    Follow-up: 6 months unless needed sooner

## 2019-10-30 ENCOUNTER — OFFICE VISIT (OUTPATIENT)
Dept: CARDIOLOGY | Facility: CLINIC | Age: 74
End: 2019-10-30

## 2019-10-30 VITALS
HEIGHT: 66 IN | WEIGHT: 161 LBS | OXYGEN SATURATION: 98 % | SYSTOLIC BLOOD PRESSURE: 120 MMHG | DIASTOLIC BLOOD PRESSURE: 78 MMHG | BODY MASS INDEX: 25.88 KG/M2 | HEART RATE: 78 BPM

## 2019-10-30 DIAGNOSIS — I10 ESSENTIAL HYPERTENSION: ICD-10-CM

## 2019-10-30 DIAGNOSIS — I48.0 PAROXYSMAL ATRIAL FIBRILLATION (HCC): Primary | ICD-10-CM

## 2019-10-30 PROCEDURE — 99214 OFFICE O/P EST MOD 30 MIN: CPT | Performed by: INTERNAL MEDICINE

## 2019-10-30 RX ORDER — ACETAMINOPHEN 160 MG
2000 TABLET,DISINTEGRATING ORAL DAILY
COMMUNITY

## 2019-10-30 RX ORDER — DICYCLOMINE HYDROCHLORIDE 10 MG/1
10 CAPSULE ORAL AS NEEDED
COMMUNITY

## 2019-10-31 PROCEDURE — 93000 ELECTROCARDIOGRAM COMPLETE: CPT | Performed by: INTERNAL MEDICINE

## 2020-07-16 NOTE — PROGRESS NOTES
Ms. Pyle is 74 y.o. female    CHIEF COMPLAINT: Recurrent UTI    HPI  This patient has been troubled for about 6 months now with recurrent UTIs.  She estimates she has had at least 4 episodes over the last 6 months.  They have been of sudden onset.  They are associated with urgency frequency and suprapubic discomfort.  Mild dysuria also noted.  No fever or flank pain.  She improves when she gets antibiotics.  Anatomically this all appears to be confined to the lower urinary tract.    Medical record summary as follows:   6/26/2020-positive culture for E. coli pansensitive  5/23/2020-urgency and lower abdominal discomfort-started on Cipro  5/1/2020-culture grew contaminant only  2/28/2020-patient grew contaminant only  2/20/2020-E. coli, pansensitive    The following portions of the patient's history were reviewed and updated as appropriate: allergies, current medications, past family history, past medical history, past social history, past surgical history and problem list.      Review of Systems   Constitutional: Negative for appetite change, diaphoresis and fever.   HENT: Negative for facial swelling and sore throat.    Eyes: Negative for discharge and visual disturbance.   Respiratory: Negative for cough and shortness of breath.    Cardiovascular: Negative for chest pain and leg swelling.   Gastrointestinal: Negative for anal bleeding and vomiting.   Endocrine: Negative for cold intolerance and heat intolerance.   Genitourinary: Positive for pelvic pain. Negative for dysuria, flank pain, frequency, hematuria and urgency.   Musculoskeletal: Negative for back pain and gait problem.   Skin: Negative for pallor and rash.   Allergic/Immunologic: Negative for immunocompromised state.   Neurological: Negative for seizures and headaches.   Hematological: Negative for adenopathy. Does not bruise/bleed easily.   Psychiatric/Behavioral: Negative for dysphoric mood, self-injury and suicidal ideas.         Current  Outpatient Medications:   •  B Complex Vitamins (B-COMPLEX/B-12 PO), Take  by mouth., Disp: , Rfl:   •  BIOTIN 5000 PO, Take  by mouth., Disp: , Rfl:   •  Cholecalciferol (VITAMIN D3) 50 MCG (2000 UT) capsule, Take 2,000 Units by mouth Daily., Disp: , Rfl:   •  Cranberry 1000 MG capsule, Take  by mouth., Disp: , Rfl:   •  dicyclomine (BENTYL) 10 MG capsule, Take 10 mg by mouth As Needed., Disp: , Rfl:   •  lisinopril (PRINIVIL,ZESTRIL) 20 MG tablet, Take 20 mg by mouth Daily., Disp: , Rfl:   •  Multiple Vitamins-Minerals (PRESERVISION AREDS 2 PO), Take  by mouth., Disp: , Rfl:   •  Multiple Vitamins-Minerals (ZINC PO), Take  by mouth., Disp: , Rfl:   •  Pediatric Multivit-Minerals-C (COMPLETE MULTI-VITAMIN PO), Take  by mouth., Disp: , Rfl:   •  Potassium 99 MG tablet, Take  by mouth., Disp: , Rfl:   •  rivaroxaban (XARELTO) 20 MG tablet, Take 20 mg by mouth Daily., Disp: , Rfl:   •  sotalol (BETAPACE) 80 MG tablet, Take 80 mg by mouth 2 (Two) Times a Day., Disp: , Rfl:   •  vitamin E 400 UNIT capsule, Take 400 Units by mouth Daily., Disp: , Rfl:   •  [START ON 7/23/2020] conjugated estrogens (Premarin) 0.625 MG/GM vaginal cream, Insert  into the vagina 2 (Two) Times a Week for 28 days. Use 0.5gms intravaginally as directed twice weekly for 21 days, then off seven days, Disp: 42.5 g, Rfl: 5  •  dicyclomine (BENTYL) 10 MG capsule, dicyclomine 10 mg capsule, Disp: , Rfl:   •  nitrofurantoin (MACRODANTIN) 50 MG capsule, Take 1 capsule by mouth Every Night for 30 days., Disp: 30 capsule, Rfl: 2    Past Medical History:   Diagnosis Date   • A-fib (CMS/HCC)    • Hypertension    • Urinary tract infection        Past Surgical History:   Procedure Laterality Date   • APPENDECTOMY     • BLADDER SURGERY     • EYE SURGERY     • FOOT FRACTURE SURGERY     • HYSTERECTOMY         Social History     Socioeconomic History   • Marital status:      Spouse name: Not on file   • Number of children: Not on file   • Years of  "education: Not on file   • Highest education level: Not on file   Tobacco Use   • Smoking status: Never Smoker   • Smokeless tobacco: Never Used   Substance and Sexual Activity   • Alcohol use: No   • Drug use: No   • Sexual activity: Defer       Family History   Problem Relation Age of Onset   • Stroke Sister    • Hypertension Sister    • Stroke Mother    • Hypertension Mother    • Stroke Father    • Hypertension Father    • Arrhythmia Brother          Temp 98.5 °F (36.9 °C)   Ht 167.6 cm (66\")   Wt 74.3 kg (163 lb 12.8 oz)   BMI 26.44 kg/m²       Physical Exam  Constitutional: Well nourished, Well developed; No apparent distress, her vital signs are reviewed  Psychiatric: Appropriate affect; Alert and oriented  Eyes: Unremarkable  Musculoskeletal: Normal gait and station  GI: Abdomen is soft, non-tender  Respiratory: No distress; Unlabored movement; No accessory musculature needed with symmetric movements  Skin: No pallor or diaphoresis  : Pale, smooth, shiny vaginal epithelium with significant decrease in rugae. Diminished elasticity and turgor of skin, Sparse pubic hair and evidence of cystocele repair noted.  A cystocele is not apparent.         Data  Results for orders placed or performed in visit on 07/20/20   Urine Culture - Urine, Urine, Catheter In/Out   Result Value Ref Range    Urine Culture No growth      Bladder Scan interpretation  Estimation of residual urine via abdominal ultrasound  Residual Urine: 30  ml  Indication: HELEN  Position: Supine  Examination: Incremental scanning of the suprapubic area using 3 MHz transducer using copious amounts of acoustic gel.   Findings: An anechoic area was demonstrated which represented the bladder, with measurement of residual urine as noted. I inspected this myself. In that the residual urine was stable or insignificant, no treatment will be necessary at this time.     Procedure note for in and out catheterization  She is placed in the dorsal lithotomy " position.  She is carefully prepped with Betadine around the urethra.  A pediatric catheterization kit is used which contains an approximate 5 [place]an catheter.this is introduced into the urinary bladder.  Approximately 30 mL of urine is obtained and sent to the lab for culture and sensitivity.    Medical record summary from Dr. Eric Pedraza as follows:   6/26/2020-positive culture for E. coli pansensitive, placed on Macrodantin  5/23/2020-urgency and lower abdominal discomfort-started on Cipro  5/1/2020-culture grew contaminant only  2/28/2020-patient grew contaminant only  2/20/2020-E. coli, pansensitive  Assessment and Plan  Diagnoses and all orders for this visit:    Recurrent UTI  -     POC Urinalysis Dipstick, Multipro  -     Urine Culture - Urine, Urine, Catheter In/Out; Future  -     US Retroperitnl Abd, Leftd  -     Urine Culture - Urine, Urine, Catheter In/Out  -     conjugated estrogens (Premarin) 0.625 MG/GM vaginal cream; Insert  into the vagina 2 (Two) Times a Week for 28 days. Use 0.5gms intravaginally as directed twice weekly for 21 days, then off seven days  -     nitrofurantoin (MACRODANTIN) 50 MG capsule; Take 1 capsule by mouth Every Night for 30 days.    Atrophic vaginitis  -     conjugated estrogens (Premarin) 0.625 MG/GM vaginal cream; Insert  into the vagina 2 (Two) Times a Week for 28 days. Use 0.5gms intravaginally as directed twice weekly for 21 days, then off seven days      -The patient understands the recurrent urinary tract infections are often multifactorial in origin.  Discussion of optimum management in setting of no anatomic abnormalities for recurrent UTIs is completed.  Antimicrobial therapies including the risks, convenience, and rationale were explained including postcoital prophylaxis, self start therapy and daily prophylaxis are all explained.  Based upon this discussion we have elected to start daily nitrofurantoin.  I did explain that some bacteria or naturally resistant to  this antibiotic but it does kill most uropathogens including Escherichia coli typically.  I explained that the attractive thickness of this antibiotic in this setting is that the resistance pattern has not changed over decades unlike some other antibiotics such as ciprofloxacin or levofloxacin.  I did explain that if she develops symptoms that are consistent with UTI while taking this antibiotic, it is possible that she will need a different antibiotic and should contact our office to arrange to be seen and have a urine culture done.  This will need to be done by an in and out cath I explained that if we were unavailable at in this setting she should see either her primary care physician or visit the [facility].  The risk of pulmonary fibrosis is explained.  Chronic cough, dyspnea, hemoptysis, or other new pulmonary symptoms should be reported right away.  The patient expresses an understanding.  Will do this for 3  months.     Is recommended that we rule out anatomic abnormalities to exclude complex urinary tract infection.  I recommended she have renal ultrasound done to rule out hydronephrosis and significant stone volume and kidney as well as cystoscopy.Cystoscopy as the definitive lower urinary tract study is discussed . The risks of pain and discomfort, infection, and urethral stricture are discussed with the patient including the technique used in the office setting.  All patient questions were answered.       The benefits of topical hormonal therapy are discussed as lowering of the vaginal pH, improvement of lubrication, and thickening of the vaginal epithelium.  It is explained how the normal vaginal mileu is a deterrent for bacterial growth.  We also discussed the risks of topical hormone therapy in postmenopausal patient's including an increase in thromboembolic events that could include myocardial infarction or stroke, as well as stimulation of the glandular tissue of the breast and  uterus. However, in patients with localized breast or uterine cancer that is successfully treated, the benefits probably outweigh the risk of recurrent cancer.   It is explained that the systemic absorption of hormonal therapy is considered to be very little especially after its initial use.  Based on this discussion and my recommendations the patient has decided to proceed with this being aware of the risks.      (Please note that portions of this note were completed with a voice recognition program.)  Jose Reyez MD  07/22/20  10:03

## 2020-07-20 ENCOUNTER — OFFICE VISIT (OUTPATIENT)
Dept: UROLOGY | Facility: CLINIC | Age: 75
End: 2020-07-20

## 2020-07-20 VITALS — TEMPERATURE: 98.5 F | HEIGHT: 66 IN | WEIGHT: 163.8 LBS | BODY MASS INDEX: 26.33 KG/M2

## 2020-07-20 DIAGNOSIS — N95.2 ATROPHIC VAGINITIS: ICD-10-CM

## 2020-07-20 DIAGNOSIS — N39.0 RECURRENT UTI: Primary | ICD-10-CM

## 2020-07-20 PROCEDURE — 99204 OFFICE O/P NEW MOD 45 MIN: CPT | Performed by: UROLOGY

## 2020-07-20 PROCEDURE — 87086 URINE CULTURE/COLONY COUNT: CPT | Performed by: UROLOGY

## 2020-07-20 RX ORDER — DICYCLOMINE HYDROCHLORIDE 10 MG/1
CAPSULE ORAL
COMMUNITY
End: 2021-06-14 | Stop reason: SDUPTHER

## 2020-07-21 ENCOUNTER — TELEPHONE (OUTPATIENT)
Dept: UROLOGY | Facility: CLINIC | Age: 75
End: 2020-07-21

## 2020-07-21 NOTE — TELEPHONE ENCOUNTER
Patient called wanting to speak to walter about a medication that was to be called in. Please call her back at [phone] thanks

## 2020-07-22 ENCOUNTER — TELEPHONE (OUTPATIENT)
Dept: UROLOGY | Facility: CLINIC | Age: 75
End: 2020-07-22

## 2020-07-22 LAB — BACTERIA SPEC AEROBE CULT: NO GROWTH

## 2020-07-22 RX ORDER — NITROFURANTOIN MACROCRYSTALS 50 MG/1
50 CAPSULE ORAL NIGHTLY
Qty: 30 CAPSULE | Refills: 2 | Status: SHIPPED | OUTPATIENT
Start: 2020-07-22 | End: 2020-08-21

## 2020-07-22 NOTE — TELEPHONE ENCOUNTER
Patient has called back wanting to speak with walter. She said she had missed her call and she really needs to speak with her.

## 2020-07-23 NOTE — TELEPHONE ENCOUNTER
Pt was called and notified that she did not have an infection and that the antibiotics had been sent to her RX. Pt verbalized understanding.

## 2020-07-23 NOTE — TELEPHONE ENCOUNTER
Patient called today and left message that she really needs to speak with walter. That she never got her test results back. Please give her a call back. Thank you.

## 2020-08-03 ENCOUNTER — PROCEDURE VISIT (OUTPATIENT)
Dept: UROLOGY | Facility: CLINIC | Age: 75
End: 2020-08-03

## 2020-08-03 DIAGNOSIS — N39.0 RECURRENT UTI: Primary | ICD-10-CM

## 2020-08-03 PROCEDURE — 52000 CYSTOURETHROSCOPY: CPT | Performed by: UROLOGY

## 2020-08-03 NOTE — PROGRESS NOTES
CC: I am here for the doctor to look at my bladder    Cystoscopy procedure note  Pre- operative diagnosis:  Recurrent UTI    Post operative diagnosis:  Same    Procedure:  The patient was prepped and draped in a normal sterile fashion.  The urethra was anesthetized with 2% lidocaine jelly.  A flexible cystoscope was introduced per urethra.   The urethra is normal in appearance without obstruction or mass.  The bladder is without evidence of mucosal lesion.   There is no abnormality of the urothelium.  There is minimal trabeculation of the detrusor muscle.  The ureteral orifices are orthotopic in position and they efflux clear urine.    Patient tolerated the procedure well    Complications: none    Blood loss: minimal    Diagnoses and all orders for this visit:    Recurrent UTI              Follow up:    Routine follow up  She needs another month or 2 of prophylactic antibiotic  She is to continue her intravaginal hormone therapy    Jose Reyez MD  8/3/2020  14:26

## 2020-12-18 DIAGNOSIS — N39.0 RECURRENT UTI: ICD-10-CM

## 2020-12-18 DIAGNOSIS — N95.2 ATROPHIC VAGINITIS: Primary | ICD-10-CM

## 2020-12-21 RX ORDER — NITROFURANTOIN MACROCRYSTALS 50 MG/1
50 CAPSULE ORAL NIGHTLY
Qty: 28 CAPSULE | Refills: 0 | Status: SHIPPED | OUTPATIENT
Start: 2020-12-21 | End: 2020-12-28

## 2021-01-08 NOTE — PROGRESS NOTES
Ms. Pyle is 75 y.o. female    CHIEF COMPLAINT: 4 month follow up for HELEN.    HPI  She returns today follow-up recurrent UTI.  She also has significant atrophic vaginitis.  She is responded well to prophylactic daily nitrofurantoin and twice weekly Premarin cream.  No infections since last seen (4 months ago).    The following portions of the patient's history were reviewed and updated as appropriate: allergies, current medications, past family history, past medical history, past social history, past surgical history and problem list.      Review of Systems   Constitutional: Negative for chills and fever.   Gastrointestinal: Negative for abdominal pain, anal bleeding and blood in stool.   Genitourinary: Negative for dysuria and hematuria.         Current Outpatient Medications:   •  B Complex Vitamins (B-COMPLEX/B-12 PO), Take  by mouth., Disp: , Rfl:   •  BIOTIN 5000 PO, Take  by mouth., Disp: , Rfl:   •  Cholecalciferol (VITAMIN D3) 50 MCG (2000 UT) capsule, Take 2,000 Units by mouth Daily., Disp: , Rfl:   •  Cranberry 1000 MG capsule, Take  by mouth., Disp: , Rfl:   •  dicyclomine (BENTYL) 10 MG capsule, Take 10 mg by mouth As Needed., Disp: , Rfl:   •  dicyclomine (BENTYL) 10 MG capsule, dicyclomine 10 mg capsule, Disp: , Rfl:   •  lisinopril (PRINIVIL,ZESTRIL) 20 MG tablet, Take 20 mg by mouth Daily., Disp: , Rfl:   •  Multiple Vitamins-Minerals (PRESERVISION AREDS 2 PO), Take  by mouth., Disp: , Rfl:   •  Multiple Vitamins-Minerals (ZINC PO), Take  by mouth., Disp: , Rfl:   •  Pediatric Multivit-Minerals-C (COMPLETE MULTI-VITAMIN PO), Take  by mouth., Disp: , Rfl:   •  Potassium 99 MG tablet, Take  by mouth., Disp: , Rfl:   •  rivaroxaban (XARELTO) 20 MG tablet, Take 20 mg by mouth Daily., Disp: , Rfl:   •  sotalol (BETAPACE) 80 MG tablet, Take 80 mg by mouth 2 (Two) Times a Day., Disp: , Rfl:   •  vitamin E 400 UNIT capsule, Take 400 Units by mouth Daily., Disp: , Rfl:   •  amoxicillin-clavulanate  "(AUGMENTIN) 500-125 MG per tablet, , Disp: , Rfl:   •  mupirocin (BACTROBAN) 2 % ointment, , Disp: , Rfl:     Past Medical History:   Diagnosis Date   • A-fib (CMS/HCC)    • Hypertension    • Urinary tract infection        Past Surgical History:   Procedure Laterality Date   • APPENDECTOMY     • BLADDER SURGERY     • EYE SURGERY     • FOOT FRACTURE SURGERY     • HYSTERECTOMY         Social History     Socioeconomic History   • Marital status:      Spouse name: Not on file   • Number of children: Not on file   • Years of education: Not on file   • Highest education level: Not on file   Tobacco Use   • Smoking status: Never Smoker   • Smokeless tobacco: Never Used   Substance and Sexual Activity   • Alcohol use: No   • Drug use: No   • Sexual activity: Defer       Family History   Problem Relation Age of Onset   • Stroke Sister    • Hypertension Sister    • Stroke Mother    • Hypertension Mother    • Stroke Father    • Hypertension Father    • Arrhythmia Brother          Temp 97.9 °F (36.6 °C)   Ht 167.6 cm (66\")   Wt 69.3 kg (152 lb 12.8 oz)   BMI 24.66 kg/m²       Physical Exam  Constitutional: Patient is without distress or deformity.  Vital signs are reviewed as above.    Neuro: No confusion; No disorientation; Alert and oriented  Pulmonary: No respiratory distress.   Skin: No pallor or diaphoresis        Data  Results for orders placed or performed in visit on 01/12/21   POC Urinalysis Dipstick, Multipro    Specimen: Urine   Result Value Ref Range    Color Yellow Yellow, Straw, Dark Yellow, Opal    Clarity, UA Clear Clear    Glucose, UA Negative Negative, 1000 mg/dL (3+) mg/dL    Bilirubin Negative Negative    Ketones, UA Negative Negative    Specific Gravity  1.025 1.005 - 1.030    Blood, UA Negative Negative    pH, Urine 6.5 5.0 - 8.0    Protein, POC Trace (A) Negative mg/dL    Urobilinogen, UA Normal Normal    Nitrite, UA Negative Negative    Leukocytes Small (1+) (A) Negative         Imaging " Results (Last 7 Days)     ** No results found for the last 168 hours. **            Assessment and Plan  Diagnoses and all orders for this visit:    1. Atrophic vaginitis (Primary)  -     POC Urinalysis Dipstick, Multipro    2. Recurrent UTI    -We will continue Premarin indefinitely.  Responding very well to this.  -We will wean daily nitrofurantoin to off.    (Please note that portions of this note were completed with a voice recognition program.)  Jose Reyez MD  01/15/21  09:17 CST

## 2021-01-12 ENCOUNTER — OFFICE VISIT (OUTPATIENT)
Dept: UROLOGY | Facility: CLINIC | Age: 76
End: 2021-01-12

## 2021-01-12 VITALS — BODY MASS INDEX: 24.56 KG/M2 | HEIGHT: 66 IN | WEIGHT: 152.8 LBS | TEMPERATURE: 97.9 F

## 2021-01-12 DIAGNOSIS — N39.0 RECURRENT UTI: ICD-10-CM

## 2021-01-12 DIAGNOSIS — N95.2 ATROPHIC VAGINITIS: Primary | ICD-10-CM

## 2021-01-12 LAB
BILIRUB BLD-MCNC: NEGATIVE MG/DL
CLARITY, POC: CLEAR
COLOR UR: YELLOW
GLUCOSE UR STRIP-MCNC: NEGATIVE MG/DL
KETONES UR QL: NEGATIVE
LEUKOCYTE EST, POC: ABNORMAL
NITRITE UR-MCNC: NEGATIVE MG/ML
PH UR: 6.5 [PH] (ref 5–8)
PROT UR STRIP-MCNC: ABNORMAL MG/DL
RBC # UR STRIP: NEGATIVE /UL
SP GR UR: 1.02 (ref 1–1.03)
UROBILINOGEN UR QL: NORMAL

## 2021-01-12 PROCEDURE — 99213 OFFICE O/P EST LOW 20 MIN: CPT | Performed by: UROLOGY

## 2021-01-12 PROCEDURE — 81003 URINALYSIS AUTO W/O SCOPE: CPT | Performed by: UROLOGY

## 2021-01-12 RX ORDER — AMOXICILLIN AND CLAVULANATE POTASSIUM 500; 125 MG/1; MG/1
TABLET, FILM COATED ORAL
COMMUNITY
Start: 2021-01-11 | End: 2021-04-08

## 2021-01-29 ENCOUNTER — TELEPHONE (OUTPATIENT)
Dept: CARDIOLOGY | Facility: CLINIC | Age: 76
End: 2021-01-29

## 2021-02-01 NOTE — TELEPHONE ENCOUNTER
Spoke with Amy at [facility] and told her what Dr Ahmadi said and she verbalized understanding.

## 2021-02-01 NOTE — TELEPHONE ENCOUNTER
Medications like Celebrex, especially in a patient who is on chronic anticoagulant therapy may increase the risk of bleeding, however may also be reasonable if they allow some pain relief.  Would recommend at least a short trial of Celebrex to see if this works for pain control.  Certainly, if it is not helping with pain control, then I would not take Celebrex.  However, if it is helping, it may be that simply a short course might be all that would be needed, in order to avoid long-term potential bleeding issues.

## 2021-02-18 DIAGNOSIS — N39.0 RECURRENT UTI: Primary | ICD-10-CM

## 2021-02-18 RX ORDER — NITROFURANTOIN MACROCRYSTALS 50 MG/1
50 CAPSULE ORAL 4 TIMES DAILY
Qty: 28 CAPSULE | Refills: 2 | Status: SHIPPED | OUTPATIENT
Start: 2021-02-18 | End: 2021-02-25

## 2021-04-06 NOTE — PROGRESS NOTES
Subjective    Ms. Pyle is 75 y.o. female    Chief Complaint: Recurrent UTI    History of Present Illness  Patient with history of recurrent urinary tract infections last seen 01/12/2021 by Dr. Reyez.  Patient had responded well to prophylactic daily nitrofurantoin twice weekly Premarin cream which she continues.  She was recently seen by a outside physician and states she was treated with antibiotic.  She is currently asymptomatic but states if she takes antibiotics she gets her right back.  Urine is clear today.  She had a negative cystoscopy.    The following portions of the patient's history were reviewed and updated as appropriate: allergies, current medications, past family history, past medical history, past social history, past surgical history and problem list.    Review of Systems   Constitutional: Negative for appetite change, chills, fatigue, fever and unexpected weight change.   HENT: Negative for congestion, dental problem, ear pain, hearing loss, nosebleeds, sinus pressure and trouble swallowing.    Eyes: Negative for pain, discharge, redness and itching.   Respiratory: Negative for apnea, cough, choking and shortness of breath.    Cardiovascular: Negative for chest pain and palpitations.   Gastrointestinal: Negative for abdominal distention, abdominal pain, blood in stool, constipation, diarrhea, nausea and vomiting.   Endocrine: Negative for cold intolerance and heat intolerance.   Genitourinary: Negative for dysuria, flank pain, frequency, hematuria and urgency.   Musculoskeletal: Negative for arthralgias, back pain and gait problem.   Skin: Negative for pallor, rash and wound.   Allergic/Immunologic: Negative for immunocompromised state.   Neurological: Negative for dizziness, tremors, seizures, weakness, numbness and headaches.   Hematological: Negative for adenopathy. Does not bruise/bleed easily.   Psychiatric/Behavioral: Negative for agitation, behavioral problems, hallucinations,  self-injury and suicidal ideas.         Current Outpatient Medications:   •  B Complex Vitamins (B-COMPLEX/B-12 PO), Take  by mouth., Disp: , Rfl:   •  BIOTIN 5000 PO, Take  by mouth., Disp: , Rfl:   •  Calcium Carbonate-Vitamin D 600-200 MG-UNIT tablet, Calcium 600 with Vitamin D3 600 mg (1,500 mg)-200 unit tablet  Take 1 tablet every day by oral route., Disp: , Rfl:   •  Cholecalciferol (VITAMIN D3) 50 MCG (2000 UT) capsule, Take 2,000 Units by mouth Daily., Disp: , Rfl:   •  Cranberry 1000 MG capsule, Take  by mouth., Disp: , Rfl:   •  dicyclomine (BENTYL) 10 MG capsule, dicyclomine 10 mg capsule, Disp: , Rfl:   •  lisinopril (PRINIVIL,ZESTRIL) 20 MG tablet, Take 20 mg by mouth Daily., Disp: , Rfl:   •  Multiple Vitamins-Minerals (PRESERVISION AREDS 2 PO), Take  by mouth., Disp: , Rfl:   •  Multiple Vitamins-Minerals (ZINC PO), Take  by mouth., Disp: , Rfl:   •  mupirocin (BACTROBAN) 2 % ointment, , Disp: , Rfl:   •  nitrofurantoin (MACRODANTIN) 50 MG capsule, , Disp: , Rfl:   •  Pediatric Multivit-Minerals-C (COMPLETE MULTI-VITAMIN PO), Take  by mouth., Disp: , Rfl:   •  Potassium 99 MG tablet, Take  by mouth., Disp: , Rfl:   •  Premarin 0.625 MG/GM vaginal cream, , Disp: , Rfl:   •  rivaroxaban (XARELTO) 20 MG tablet, Take 20 mg by mouth Daily., Disp: , Rfl:   •  sotalol (BETAPACE) 80 MG tablet, Take 80 mg by mouth 2 (Two) Times a Day., Disp: , Rfl:   •  vitamin E 400 UNIT capsule, Take 400 Units by mouth Daily., Disp: , Rfl:   •  dicyclomine (BENTYL) 10 MG capsule, Take 10 mg by mouth As Needed., Disp: , Rfl:   •  nitrofurantoin (MACRODANTIN) 50 MG capsule, Take 1 capsule by mouth Every Night., Disp: 30 capsule, Rfl: 2    Past Medical History:   Diagnosis Date   • A-fib (CMS/HCC)    • Hypertension    • Urinary tract infection        Past Surgical History:   Procedure Laterality Date   • APPENDECTOMY     • BLADDER SURGERY     • EYE SURGERY     • FOOT FRACTURE SURGERY     • HYSTERECTOMY         Social History  "    Socioeconomic History   • Marital status:      Spouse name: Not on file   • Number of children: Not on file   • Years of education: Not on file   • Highest education level: Not on file   Tobacco Use   • Smoking status: Never Smoker   • Smokeless tobacco: Never Used   Vaping Use   • Vaping Use: Never used   Substance and Sexual Activity   • Alcohol use: No   • Drug use: No   • Sexual activity: Defer       Family History   Problem Relation Age of Onset   • Stroke Sister    • Hypertension Sister    • Stroke Mother    • Hypertension Mother    • Stroke Father    • Hypertension Father    • Arrhythmia Brother        Objective    Temp 98.1 °F (36.7 °C) (Temporal)   Ht 167.6 cm (66\")   Wt 71.1 kg (156 lb 12.8 oz)   BMI 25.31 kg/m²     Physical Exam  Vitals reviewed.   Constitutional:       Appearance: Normal appearance.   HENT:      Head: Normocephalic and atraumatic.      Right Ear: External ear normal.      Left Ear: External ear normal.      Nose: No congestion.   Eyes:      Conjunctiva/sclera: Conjunctivae normal.   Neck:      Comments: I observed no obvious neck masses  Pulmonary:      Effort: Pulmonary effort is normal.   Skin:     Coloration: Skin is not pale.      Findings: No rash.      Comments: Observed no obvious facial rash.   Neurological:      General: No focal deficit present.      Mental Status: She is alert and oriented to person, place, and time.   Psychiatric:         Mood and Affect: Mood normal.         Behavior: Behavior normal.             Results for orders placed or performed in visit on 04/08/21   POC Urinalysis Dipstick, Multipro    Specimen: Urine   Result Value Ref Range    Color Yellow Yellow, Straw, Dark Yellow, Opal    Clarity, UA Clear Clear    Glucose, UA Negative Negative, 1000 mg/dL (3+) mg/dL    Bilirubin Negative Negative    Ketones, UA Negative Negative    Specific Gravity  1.025 1.005 - 1.030    Blood, UA Negative Negative    pH, Urine 5.5 5.0 - 8.0    Protein, POC " Negative Negative mg/dL    Urobilinogen, UA Normal Normal    Nitrite, UA Negative Negative    Leukocytes Negative Negative   I personally viewed urinalysis results and there was no indication for microscopic evaluation.  Assessment and Plan    Diagnoses and all orders for this visit:    1. Recurrent UTI (Primary)  -     POC Urinalysis Dipstick, Multipro  -     nitrofurantoin (MACRODANTIN) 50 MG capsule; Take 1 capsule by mouth Every Night.  Dispense: 30 capsule; Refill: 2  -     US Renal Bilateral; Future  -     XR Abdomen KUB; Future    Patient with history of recurrent UTIs recently had a urinary tract infection treated with antibiotic Augmentin.  Fortunately was not able to observe urine culture.  Urine is clear today she is asymptomatic.  I did recommend she continue Premarin cream as directed.  The patient will continue daily nitrofurantoin.  She has had a negative cystoscopy but will have her return with renal ultrasound and KUB to evaluate for any obvious nidus for infection.

## 2021-04-08 ENCOUNTER — OFFICE VISIT (OUTPATIENT)
Dept: UROLOGY | Facility: CLINIC | Age: 76
End: 2021-04-08

## 2021-04-08 VITALS — WEIGHT: 156.8 LBS | BODY MASS INDEX: 25.2 KG/M2 | TEMPERATURE: 98.1 F | HEIGHT: 66 IN

## 2021-04-08 DIAGNOSIS — N39.0 RECURRENT UTI: Primary | ICD-10-CM

## 2021-04-08 LAB
BILIRUB BLD-MCNC: NEGATIVE MG/DL
CLARITY, POC: CLEAR
COLOR UR: YELLOW
GLUCOSE UR STRIP-MCNC: NEGATIVE MG/DL
KETONES UR QL: NEGATIVE
LEUKOCYTE EST, POC: NEGATIVE
NITRITE UR-MCNC: NEGATIVE MG/ML
PH UR: 5.5 [PH] (ref 5–8)
PROT UR STRIP-MCNC: NEGATIVE MG/DL
RBC # UR STRIP: NEGATIVE /UL
SP GR UR: 1.02 (ref 1–1.03)
UROBILINOGEN UR QL: NORMAL

## 2021-04-08 PROCEDURE — 81003 URINALYSIS AUTO W/O SCOPE: CPT | Performed by: PHYSICIAN ASSISTANT

## 2021-04-08 PROCEDURE — 99213 OFFICE O/P EST LOW 20 MIN: CPT | Performed by: PHYSICIAN ASSISTANT

## 2021-04-08 RX ORDER — NITROFURANTOIN MACROCRYSTALS 50 MG/1
50 CAPSULE ORAL NIGHTLY
Qty: 30 CAPSULE | Refills: 2 | Status: SHIPPED | OUTPATIENT
Start: 2021-04-08 | End: 2021-06-14

## 2021-04-08 RX ORDER — CONJUGATED ESTROGENS 0.62 MG/G
0.5 CREAM VAGINAL DAILY
COMMUNITY
Start: 2021-02-22 | End: 2022-03-23 | Stop reason: SDUPTHER

## 2021-04-08 RX ORDER — NITROFURANTOIN MACROCRYSTALS 50 MG/1
CAPSULE ORAL
COMMUNITY
Start: 2021-04-02 | End: 2021-06-14

## 2021-04-20 NOTE — PROGRESS NOTES
Subjective    Ms. Pyle is 75 y.o. female    Chief Complaint: 2 week follow up Kidney Stone    History of Present Illness  Patient with history of recurrent urinary tract infections.  Patient is on Premarin cream and was started on daily nitrofurantoin.  She has had a cystoscopy which was negative.  She returns today after getting renal ultrasound and KUB.  She is currently asymptomatic.    The following portions of the patient's history were reviewed and updated as appropriate: allergies, current medications, past family history, past medical history, past social history, past surgical history and problem list.    Review of Systems   Constitutional: Negative for appetite change, chills, fatigue, fever and unexpected weight change.   HENT: Negative for congestion, dental problem, ear pain, hearing loss, nosebleeds, sinus pressure and trouble swallowing.    Eyes: Negative for pain, discharge, redness and itching.   Respiratory: Negative for apnea, cough, choking and shortness of breath.    Cardiovascular: Negative for chest pain and palpitations.   Gastrointestinal: Negative for abdominal distention, abdominal pain, blood in stool, constipation, diarrhea, nausea and vomiting.   Endocrine: Negative for cold intolerance and heat intolerance.   Genitourinary: Negative for dysuria, flank pain, frequency, hematuria and urgency.   Musculoskeletal: Negative for arthralgias, back pain and gait problem.   Skin: Negative for pallor, rash and wound.   Allergic/Immunologic: Negative for immunocompromised state.   Neurological: Negative for dizziness, tremors, seizures, weakness, numbness and headaches.   Hematological: Negative for adenopathy. Does not bruise/bleed easily.   Psychiatric/Behavioral: Negative for agitation, behavioral problems, hallucinations, self-injury and suicidal ideas.         Current Outpatient Medications:   •  B Complex Vitamins (B-COMPLEX/B-12 PO), Take  by mouth., Disp: , Rfl:   •  BIOTIN 5000 PO, Take   by mouth., Disp: , Rfl:   •  Calcium Carbonate-Vitamin D 600-200 MG-UNIT tablet, Calcium 600 with Vitamin D3 600 mg (1,500 mg)-200 unit tablet  Take 1 tablet every day by oral route., Disp: , Rfl:   •  Cholecalciferol (VITAMIN D3) 50 MCG (2000 UT) capsule, Take 2,000 Units by mouth Daily., Disp: , Rfl:   •  Cranberry 1000 MG capsule, Take  by mouth., Disp: , Rfl:   •  dicyclomine (BENTYL) 10 MG capsule, Take 10 mg by mouth As Needed., Disp: , Rfl:   •  lisinopril (PRINIVIL,ZESTRIL) 20 MG tablet, Take 20 mg by mouth Daily., Disp: , Rfl:   •  Multiple Vitamins-Minerals (PRESERVISION AREDS 2 PO), Take  by mouth., Disp: , Rfl:   •  Multiple Vitamins-Minerals (ZINC PO), Take  by mouth., Disp: , Rfl:   •  mupirocin (BACTROBAN) 2 % ointment, , Disp: , Rfl:   •  nitrofurantoin (MACRODANTIN) 50 MG capsule, Take 1 capsule by mouth Every Night., Disp: 30 capsule, Rfl: 2  •  Pediatric Multivit-Minerals-C (COMPLETE MULTI-VITAMIN PO), Take  by mouth., Disp: , Rfl:   •  Potassium 99 MG tablet, Take  by mouth., Disp: , Rfl:   •  Premarin 0.625 MG/GM vaginal cream, , Disp: , Rfl:   •  rivaroxaban (XARELTO) 20 MG tablet, Take 20 mg by mouth Daily., Disp: , Rfl:   •  sotalol (BETAPACE) 80 MG tablet, Take 80 mg by mouth 2 (Two) Times a Day., Disp: , Rfl:   •  vitamin E 400 UNIT capsule, Take 400 Units by mouth Daily., Disp: , Rfl:   •  dicyclomine (BENTYL) 10 MG capsule, dicyclomine 10 mg capsule, Disp: , Rfl:   •  nitrofurantoin (MACRODANTIN) 50 MG capsule, , Disp: , Rfl:     Past Medical History:   Diagnosis Date   • A-fib (CMS/HCC)    • Hypertension    • Urinary tract infection        Past Surgical History:   Procedure Laterality Date   • APPENDECTOMY     • BLADDER SURGERY     • EYE SURGERY     • FOOT FRACTURE SURGERY     • HYSTERECTOMY         Social History     Socioeconomic History   • Marital status:      Spouse name: Not on file   • Number of children: Not on file   • Years of education: Not on file   • Highest  "education level: Not on file   Tobacco Use   • Smoking status: Never Smoker   • Smokeless tobacco: Never Used   Vaping Use   • Vaping Use: Never used   Substance and Sexual Activity   • Alcohol use: No   • Drug use: No   • Sexual activity: Defer       Family History   Problem Relation Age of Onset   • Stroke Sister    • Hypertension Sister    • Stroke Mother    • Hypertension Mother    • Stroke Father    • Hypertension Father    • Arrhythmia Brother        Objective    Temp 98.6 °F (37 °C) (Temporal)   Ht 167.6 cm (66\")   Wt 70.8 kg (156 lb)   BMI 25.18 kg/m²     Physical Exam  Vitals reviewed.   Constitutional:       Appearance: Normal appearance.   HENT:      Head: Normocephalic and atraumatic.      Right Ear: External ear normal.      Left Ear: External ear normal.      Nose: No congestion.   Eyes:      Conjunctiva/sclera: Conjunctivae normal.   Neck:      Comments: No obvious neck masses.  Pulmonary:      Effort: Pulmonary effort is normal.   Skin:     Coloration: Skin is not pale.      Findings: No rash.      Comments: I observed no obvious facial rash.   Neurological:      General: No focal deficit present.      Mental Status: She is alert and oriented to person, place, and time.   Psychiatric:         Mood and Affect: Mood normal.         Behavior: Behavior normal.             Results for orders placed or performed in visit on 04/27/21   POC Urinalysis Dipstick, Multipro    Specimen: Urine   Result Value Ref Range    Color Yellow Yellow, Straw, Dark Yellow, Opal    Clarity, UA Clear Clear    Glucose, UA Negative Negative, 1000 mg/dL (3+) mg/dL    Bilirubin Negative Negative    Ketones, UA Trace (A) Negative    Specific Gravity  1.025 1.005 - 1.030    Blood, UA Trace (A) Negative    pH, Urine 7.0 5.0 - 8.0    Protein, POC Negative Negative mg/dL    Urobilinogen, UA Normal Normal    Nitrite, UA Negative Negative    Leukocytes Trace (A) Negative     KUB independent review    A KUB is available for me to " review today.  The image is inspected for a bowel gas pattern and the general bone structure of the spine and pelvis. The kidneys are then inspected closely.  Renal outline is noted if identifiable. The kidney, collecting system, and anticipated path of the ureter are examined for calcifications including those in the true pelvis.  This film reveals:    On the right there are no calcificaitons seen in the kidney or the expected course of the ureter.     On the left there are no calcificaitons seen in the kidney or the expected course of the ureter.    Renal ultrasound:  I inspected both kidneys for contour shape size there is no hydronephrosis.  There is a small benign-appearing right renal cyst.    Assessment and Plan    Diagnoses and all orders for this visit:    1. Recurrent UTI (Primary)  -     POC Urinalysis Dipstick, Multipro  Patient with history of recurrent urinary tract infections here for follow up after getting KUB and renal ultrasound.  Renal ultrasound showed a benign right renal cyst but no other findings.  KUB revealed no obvious calcifications to indicate stones.

## 2021-04-27 ENCOUNTER — HOSPITAL ENCOUNTER (OUTPATIENT)
Dept: ULTRASOUND IMAGING | Facility: HOSPITAL | Age: 76
Discharge: HOME | End: 2021-04-27

## 2021-04-27 ENCOUNTER — HOSPITAL ENCOUNTER (OUTPATIENT)
Dept: GENERAL RADIOLOGY | Facility: HOSPITAL | Age: 76
Discharge: HOME | End: 2021-04-27

## 2021-04-27 ENCOUNTER — OFFICE VISIT (OUTPATIENT)
Dept: UROLOGY | Facility: CLINIC | Age: 76
End: 2021-04-27

## 2021-04-27 VITALS — BODY MASS INDEX: 25.07 KG/M2 | HEIGHT: 66 IN | TEMPERATURE: 98.6 F | WEIGHT: 156 LBS

## 2021-04-27 DIAGNOSIS — N39.0 RECURRENT UTI: ICD-10-CM

## 2021-04-27 DIAGNOSIS — N39.0 RECURRENT UTI: Primary | ICD-10-CM

## 2021-04-27 LAB
BILIRUB BLD-MCNC: NEGATIVE MG/DL
CLARITY, POC: CLEAR
COLOR UR: YELLOW
GLUCOSE UR STRIP-MCNC: NEGATIVE MG/DL
KETONES UR QL: ABNORMAL
LEUKOCYTE EST, POC: ABNORMAL
NITRITE UR-MCNC: NEGATIVE MG/ML
PH UR: 7 [PH] (ref 5–8)
PROT UR STRIP-MCNC: NEGATIVE MG/DL
RBC # UR STRIP: ABNORMAL /UL
SP GR UR: 1.02 (ref 1–1.03)
UROBILINOGEN UR QL: NORMAL

## 2021-04-27 PROCEDURE — 74018 RADEX ABDOMEN 1 VIEW: CPT

## 2021-04-27 PROCEDURE — 99214 OFFICE O/P EST MOD 30 MIN: CPT | Performed by: PHYSICIAN ASSISTANT

## 2021-04-27 PROCEDURE — 76775 US EXAM ABDO BACK WALL LIM: CPT

## 2021-04-27 PROCEDURE — 81003 URINALYSIS AUTO W/O SCOPE: CPT | Performed by: PHYSICIAN ASSISTANT

## 2021-06-14 ENCOUNTER — OFFICE VISIT (OUTPATIENT)
Dept: CARDIOLOGY | Facility: CLINIC | Age: 76
End: 2021-06-14

## 2021-06-14 VITALS
HEART RATE: 62 BPM | BODY MASS INDEX: 26.03 KG/M2 | OXYGEN SATURATION: 99 % | WEIGHT: 162 LBS | SYSTOLIC BLOOD PRESSURE: 142 MMHG | DIASTOLIC BLOOD PRESSURE: 64 MMHG | HEIGHT: 66 IN

## 2021-06-14 DIAGNOSIS — I10 ESSENTIAL HYPERTENSION: ICD-10-CM

## 2021-06-14 DIAGNOSIS — I48.0 PAROXYSMAL ATRIAL FIBRILLATION (HCC): Primary | ICD-10-CM

## 2021-06-14 PROCEDURE — 99213 OFFICE O/P EST LOW 20 MIN: CPT | Performed by: INTERNAL MEDICINE

## 2021-06-14 PROCEDURE — 93000 ELECTROCARDIOGRAM COMPLETE: CPT | Performed by: INTERNAL MEDICINE

## 2021-06-14 RX ORDER — PHENYLEPHRINE HCL 10 MG
3600 TABLET ORAL 2 TIMES DAILY
COMMUNITY

## 2021-06-14 NOTE — PROGRESS NOTES
Subjective:     Encounter Date:06/14/2021      Patient ID: Vane Pyle is a 75 y.o. female with a history of paroxysmal atrial fibrillation, status post ablation on 2 occasions by Dr. Quinteros, also with hypertension, presenting today for follow-up.    Chief Complaint: Here for follow-up of atrial fibrillation    Atrial Fibrillation  Presents for follow-up visit. Symptoms include palpitations (rarely). Symptoms are negative for chest pain, dizziness, hemodynamic instability, shortness of breath and syncope. The symptoms have been stable. Past medical history includes atrial fibrillation. There are no medication compliance problems.   Hypertension  This is a chronic problem. The problem is unchanged. The problem is controlled. Associated symptoms include palpitations (rarely). Pertinent negatives include no chest pain, headaches, orthopnea, peripheral edema, PND or shortness of breath. Agents associated with hypertension include estrogens. Risk factors for coronary artery disease include post-menopausal state. Current antihypertension treatment includes ACE inhibitors. The current treatment provides significant improvement. There are no compliance problems.  There is no history of CAD/MI.       Current Outpatient Medications:   •  B Complex Vitamins (B-COMPLEX/B-12 PO), Take  by mouth., Disp: , Rfl:   •  BIOTIN 5000 PO, Take  by mouth., Disp: , Rfl:   •  Calcium Carbonate-Vitamin D 600-200 MG-UNIT tablet, Calcium 600 with Vitamin D3 600 mg (1,500 mg)-200 unit tablet  Take 1 tablet every day by oral route., Disp: , Rfl:   •  Cholecalciferol (VITAMIN D3) 50 MCG (2000 UT) capsule, Take 2,000 Units by mouth Daily., Disp: , Rfl:   •  Cranberry 1000 MG capsule, Take  by mouth., Disp: , Rfl:   •  dicyclomine (BENTYL) 10 MG capsule, Take 10 mg by mouth As Needed., Disp: , Rfl:   •  lisinopril (PRINIVIL,ZESTRIL) 20 MG tablet, Take 20 mg by mouth Daily., Disp: , Rfl:   •  Multiple Vitamins-Minerals (PRESERVISION AREDS 2  PO), Take  by mouth., Disp: , Rfl:   •  Multiple Vitamins-Minerals (ZINC PO), Take  by mouth., Disp: , Rfl:   •  Omega-3 Fatty Acids (Fish Oil Double Strength) 1200 MG capsule, Take 3,600 mg by mouth 2 (two) times a day., Disp: , Rfl:   •  Pediatric Multivit-Minerals-C (COMPLETE MULTI-VITAMIN PO), Take  by mouth., Disp: , Rfl:   •  Potassium 99 MG tablet, Take  by mouth., Disp: , Rfl:   •  Premarin 0.625 MG/GM vaginal cream, Insert 0.5 g into the vagina Daily., Disp: , Rfl:   •  rivaroxaban (XARELTO) 20 MG tablet, Take 20 mg by mouth Daily., Disp: , Rfl:   •  sotalol (BETAPACE) 80 MG tablet, Take 80 mg by mouth 2 (Two) Times a Day., Disp: , Rfl:   •  vitamin E 400 UNIT capsule, Take 400 Units by mouth Daily., Disp: , Rfl:     Allergies   Allergen Reactions   • Latex Hives   • Sulfa Antibiotics Itching     Social History     Tobacco Use   • Smoking status: Never Smoker   • Smokeless tobacco: Never Used   Substance Use Topics   • Alcohol use: No     Review of Systems   Constitutional: Negative for fever and weight loss.   Cardiovascular: Positive for palpitations (rarely). Negative for chest pain, dyspnea on exertion, leg swelling, orthopnea, paroxysmal nocturnal dyspnea and syncope.   Respiratory: Negative for cough, shortness of breath and wheezing.    Hematologic/Lymphatic: Negative for adenopathy and bleeding problem.   Gastrointestinal: Negative for abdominal pain, hematemesis, hematochezia, melena, nausea and vomiting.   Neurological: Negative for dizziness, headaches and loss of balance.         ECG 12 Lead    Date/Time: 6/14/2021 12:39 PM  Performed by: Ad Ahmadi MD  Authorized by: Ad Ahmadi MD   Comparison: compared with previous ECG from 10/30/2019  Similar to previous ECG  Rhythm: sinus rhythm  Rate: normal  BPM: 61  Conduction: conduction normal  ST Segments: ST segments normal  T Waves: T waves normal  QRS axis: normal    Clinical impression: normal ECG               Objective:  "    Vitals reviewed.   Constitutional:       General: Not in acute distress.     Appearance: Healthy appearance. Well-developed.   Eyes:      Extraocular Movements: Extraocular movements intact.      Pupils: Pupils are equal, round, and reactive to light.   HENT:      Head: Normocephalic and atraumatic.   Neck:      Thyroid: No thyroid mass.      Vascular: No JVD.   Pulmonary:      Effort: Pulmonary effort is normal.      Breath sounds: Normal breath sounds. No wheezing. No rhonchi. No rales.   Cardiovascular:      Normal rate. Regular rhythm.      Murmurs: There is no murmur.      No gallop.   Pulses:     Intact distal pulses.   Edema:     Peripheral edema absent.   Abdominal:      General: Bowel sounds are normal. There is no distension.      Palpations: Abdomen is soft.      Tenderness: There is no abdominal tenderness.   Musculoskeletal: Normal range of motion.      Extremities: No clubbing present.     Cervical back: Normal range of motion and neck supple. Skin:     General: Skin is warm and dry. There is no cyanosis.      Findings: No erythema or rash.   Neurological:      Mental Status: Alert and oriented to person, place, and time.      Cranial Nerves: No cranial nerve deficit.       /64   Pulse 62   Ht 167.6 cm (66\")   Wt 73.5 kg (162 lb)   SpO2 99%   BMI 26.15 kg/m²     Data/Lab Review:     Echocardiogram in October 2018: Normal left-ventricular systolic function, mild to moderate concentric LVH, no significant valvular abnormalities.      Assessment:          Diagnosis Plan   1. Paroxysmal atrial fibrillation (CMS/HCC)  ECG 12 Lead   2. Essential hypertension          Plan:       1.  Paroxysmal atrial fibrillation: The patient remains reasonably stable at this time.  She is only experiencing rare episodes of palpitations.  She remains chronically anticoagulated and is tolerating this well.  No changes at this time.    2.  Essential hypertension: The patient's blood pressure is generally well " controlled.  Continue lisinopril.    We will plan to see the patient back in 12 months unless otherwise needed sooner.

## 2021-07-21 NOTE — PROGRESS NOTES
Subjective    Ms. Pyle is 75 y.o. female    Chief Complaint:     History of Present Illness  Patient with history of recurrent UTIs here for 3-month follow up.  Previous renal ultrasound and KUB done at her last visit were negative except for right renal cyst which was benign appearing. She has not had any culture positive infection since she was last seen she is on Premarin cream and daily nitrofurantoin.  Urine is not suspicious for infection today she is asymptomatic.  Cystoscopy done 08/03/2020 was negative for any abnormality.    The following portions of the patient's history were reviewed and updated as appropriate: allergies, current medications, past family history, past medical history, past social history, past surgical history and problem list.    Review of Systems   Constitutional: Negative for chills and fever.   Gastrointestinal: Negative for abdominal pain, anal bleeding and blood in stool.   Genitourinary: Negative for dysuria and hematuria.         Current Outpatient Medications:   •  B Complex Vitamins (B-COMPLEX/B-12 PO), Take  by mouth., Disp: , Rfl:   •  BIOTIN 5000 PO, Take  by mouth., Disp: , Rfl:   •  Calcium Carbonate-Vitamin D 600-200 MG-UNIT tablet, Calcium 600 with Vitamin D3 600 mg (1,500 mg)-200 unit tablet  Take 1 tablet every day by oral route., Disp: , Rfl:   •  Cholecalciferol (VITAMIN D3) 50 MCG (2000 UT) capsule, Take 2,000 Units by mouth Daily., Disp: , Rfl:   •  Cranberry 1000 MG capsule, Take  by mouth., Disp: , Rfl:   •  dicyclomine (BENTYL) 10 MG capsule, Take 10 mg by mouth As Needed., Disp: , Rfl:   •  lisinopril (PRINIVIL,ZESTRIL) 20 MG tablet, Take 20 mg by mouth Daily., Disp: , Rfl:   •  Multiple Vitamins-Minerals (PRESERVISION AREDS 2 PO), Take  by mouth., Disp: , Rfl:   •  Multiple Vitamins-Minerals (ZINC PO), Take  by mouth., Disp: , Rfl:   •  Omega-3 Fatty Acids (Fish Oil Double Strength) 1200 MG capsule, Take 3,600 mg by mouth 2 (two) times a day., Disp: , Rfl:  "  •  Pediatric Multivit-Minerals-C (COMPLETE MULTI-VITAMIN PO), Take  by mouth., Disp: , Rfl:   •  Potassium 99 MG tablet, Take  by mouth., Disp: , Rfl:   •  Premarin 0.625 MG/GM vaginal cream, Insert 0.5 g into the vagina Daily., Disp: , Rfl:   •  rivaroxaban (XARELTO) 20 MG tablet, Take 20 mg by mouth Daily., Disp: , Rfl:   •  sotalol (BETAPACE) 80 MG tablet, Take 80 mg by mouth 2 (Two) Times a Day., Disp: , Rfl:   •  vitamin E 400 UNIT capsule, Take 400 Units by mouth Daily., Disp: , Rfl:     Past Medical History:   Diagnosis Date   • A-fib (CMS/HCC)    • Hypertension    • Urinary tract infection        Past Surgical History:   Procedure Laterality Date   • APPENDECTOMY     • BLADDER SURGERY     • EYE SURGERY     • FOOT FRACTURE SURGERY     • HYSTERECTOMY         Social History     Socioeconomic History   • Marital status:      Spouse name: Not on file   • Number of children: Not on file   • Years of education: Not on file   • Highest education level: Not on file   Tobacco Use   • Smoking status: Never Smoker   • Smokeless tobacco: Never Used   Vaping Use   • Vaping Use: Never used   Substance and Sexual Activity   • Alcohol use: No   • Drug use: No   • Sexual activity: Defer       Family History   Problem Relation Age of Onset   • Stroke Sister    • Hypertension Sister    • Stroke Mother    • Hypertension Mother    • Stroke Father    • Hypertension Father    • Arrhythmia Brother        Objective    Temp 97 °F (36.1 °C)   Ht 167.6 cm (66\")   Wt 73.5 kg (162 lb)   BMI 26.15 kg/m²     Physical Exam  Vitals reviewed.   Constitutional:       Appearance: Normal appearance.   HENT:      Head: Normocephalic and atraumatic.      Right Ear: External ear normal.      Left Ear: External ear normal.   Pulmonary:      Effort: Pulmonary effort is normal.   Neurological:      General: No focal deficit present.      Mental Status: She is alert and oriented to person, place, and time.   Psychiatric:         Mood and " Affect: Mood normal.         Behavior: Behavior normal.             Results for orders placed or performed in visit on 07/28/21   POC Urinalysis Dipstick, Multipro    Specimen: Urine   Result Value Ref Range    Color Yellow Yellow, Straw, Dark Yellow, Opal    Clarity, UA Cloudy (A) Clear    Glucose, UA Negative Negative, 1000 mg/dL (3+) mg/dL    Bilirubin Negative Negative    Ketones, UA Trace (A) Negative    Specific Gravity  1.020 1.005 - 1.030    Blood, UA Negative Negative    pH, Urine 6.5 5.0 - 8.0    Protein, POC Negative Negative mg/dL    Urobilinogen, UA Normal Normal    Nitrite, UA Negative Negative    Leukocytes Small (1+) (A) Negative     Assessment and Plan    Diagnoses and all orders for this visit:    1. Recurrent UTI (Primary)  -     POC Urinalysis Dipstick, Multipro    2. Cyst of right kidney  -     US Renal Bilateral; Future    Infection since last seen she is asymptomatic and doing well.  She will continue daily nitrofurantoin and Premarin cream.  She did have a right renal cyst found on renal ultrasound for evaluation of recurrent UTIs.  Follow-up 6 months repeat ultrasound if still benign-appearing no further imaging follow-up indicated.

## 2021-07-28 ENCOUNTER — OFFICE VISIT (OUTPATIENT)
Dept: UROLOGY | Facility: CLINIC | Age: 76
End: 2021-07-28

## 2021-07-28 VITALS — HEIGHT: 66 IN | BODY MASS INDEX: 26.03 KG/M2 | WEIGHT: 162 LBS | TEMPERATURE: 97 F

## 2021-07-28 DIAGNOSIS — N28.1 CYST OF RIGHT KIDNEY: ICD-10-CM

## 2021-07-28 DIAGNOSIS — N39.0 RECURRENT UTI: Primary | ICD-10-CM

## 2021-07-28 LAB
BILIRUB BLD-MCNC: NEGATIVE MG/DL
CLARITY, POC: ABNORMAL
COLOR UR: YELLOW
GLUCOSE UR STRIP-MCNC: NEGATIVE MG/DL
KETONES UR QL: ABNORMAL
LEUKOCYTE EST, POC: ABNORMAL
NITRITE UR-MCNC: NEGATIVE MG/ML
PH UR: 6.5 [PH] (ref 5–8)
PROT UR STRIP-MCNC: NEGATIVE MG/DL
RBC # UR STRIP: NEGATIVE /UL
SP GR UR: 1.02 (ref 1–1.03)
UROBILINOGEN UR QL: NORMAL

## 2021-07-28 PROCEDURE — 81001 URINALYSIS AUTO W/SCOPE: CPT | Performed by: PHYSICIAN ASSISTANT

## 2021-07-28 PROCEDURE — 99213 OFFICE O/P EST LOW 20 MIN: CPT | Performed by: PHYSICIAN ASSISTANT

## 2021-07-29 DIAGNOSIS — N39.0 RECURRENT UTI: ICD-10-CM

## 2021-07-29 RX ORDER — NITROFURANTOIN MACROCRYSTALS 50 MG/1
50 CAPSULE ORAL NIGHTLY
Qty: 30 CAPSULE | Refills: 2 | Status: SHIPPED | OUTPATIENT
Start: 2021-07-29 | End: 2021-09-24

## 2021-09-08 ENCOUNTER — HOSPITAL ENCOUNTER (EMERGENCY)
Facility: HOSPITAL | Age: 76
Discharge: HOME | End: 2021-09-08
Attending: EMERGENCY MEDICINE | Admitting: EMERGENCY MEDICINE

## 2021-09-08 VITALS
HEART RATE: 58 BPM | RESPIRATION RATE: 16 BRPM | BODY MASS INDEX: 25.71 KG/M2 | OXYGEN SATURATION: 98 % | HEIGHT: 66 IN | TEMPERATURE: 98 F | WEIGHT: 160 LBS | DIASTOLIC BLOOD PRESSURE: 66 MMHG | SYSTOLIC BLOOD PRESSURE: 124 MMHG

## 2021-09-08 DIAGNOSIS — I48.0 PAROXYSMAL ATRIAL FIBRILLATION (HCC): Primary | ICD-10-CM

## 2021-09-08 LAB
ALBUMIN SERPL-MCNC: 4.2 G/DL (ref 3.5–5.2)
ALBUMIN/GLOB SERPL: 1.8 G/DL
ALP SERPL-CCNC: 89 U/L (ref 39–117)
ALT SERPL W P-5'-P-CCNC: 14 U/L (ref 1–33)
ANION GAP SERPL CALCULATED.3IONS-SCNC: 11 MMOL/L (ref 5–15)
AST SERPL-CCNC: 21 U/L (ref 1–32)
BASOPHILS # BLD AUTO: 0.04 10*3/MM3 (ref 0–0.2)
BASOPHILS NFR BLD AUTO: 0.4 % (ref 0–1.5)
BILIRUB SERPL-MCNC: 0.6 MG/DL (ref 0–1.2)
BUN SERPL-MCNC: 18 MG/DL (ref 8–23)
BUN/CREAT SERPL: 25.4 (ref 7–25)
CALCIUM SPEC-SCNC: 9.1 MG/DL (ref 8.6–10.5)
CHLORIDE SERPL-SCNC: 109 MMOL/L (ref 98–107)
CO2 SERPL-SCNC: 24 MMOL/L (ref 22–29)
CREAT SERPL-MCNC: 0.71 MG/DL (ref 0.57–1)
D DIMER PPP FEU-MCNC: 0.29 MG/L (FEU) (ref 0–0.5)
DEPRECATED RDW RBC AUTO: 43.9 FL (ref 37–54)
EOSINOPHIL # BLD AUTO: 0.11 10*3/MM3 (ref 0–0.4)
EOSINOPHIL NFR BLD AUTO: 1.2 % (ref 0.3–6.2)
ERYTHROCYTE [DISTWIDTH] IN BLOOD BY AUTOMATED COUNT: 13.4 % (ref 12.3–15.4)
GFR SERPL CREATININE-BSD FRML MDRD: 80 ML/MIN/1.73
GLOBULIN UR ELPH-MCNC: 2.4 GM/DL
GLUCOSE SERPL-MCNC: 122 MG/DL (ref 65–99)
HCT VFR BLD AUTO: 43.3 % (ref 34–46.6)
HGB BLD-MCNC: 14.7 G/DL (ref 12–15.9)
HOLD SPECIMEN: NORMAL
HOLD SPECIMEN: NORMAL
IMM GRANULOCYTES # BLD AUTO: 0.02 10*3/MM3 (ref 0–0.05)
IMM GRANULOCYTES NFR BLD AUTO: 0.2 % (ref 0–0.5)
LYMPHOCYTES # BLD AUTO: 2.03 10*3/MM3 (ref 0.7–3.1)
LYMPHOCYTES NFR BLD AUTO: 21.9 % (ref 19.6–45.3)
MAGNESIUM SERPL-MCNC: 2.1 MG/DL (ref 1.6–2.4)
MCH RBC QN AUTO: 30.3 PG (ref 26.6–33)
MCHC RBC AUTO-ENTMCNC: 33.9 G/DL (ref 31.5–35.7)
MCV RBC AUTO: 89.3 FL (ref 79–97)
MONOCYTES # BLD AUTO: 0.77 10*3/MM3 (ref 0.1–0.9)
MONOCYTES NFR BLD AUTO: 8.3 % (ref 5–12)
NEUTROPHILS NFR BLD AUTO: 6.31 10*3/MM3 (ref 1.7–7)
NEUTROPHILS NFR BLD AUTO: 68 % (ref 42.7–76)
NRBC BLD AUTO-RTO: 0 /100 WBC (ref 0–0.2)
PLATELET # BLD AUTO: 214 10*3/MM3 (ref 140–450)
PMV BLD AUTO: 11 FL (ref 6–12)
POTASSIUM SERPL-SCNC: 3.9 MMOL/L (ref 3.5–5.2)
PROT SERPL-MCNC: 6.6 G/DL (ref 6–8.5)
RBC # BLD AUTO: 4.85 10*6/MM3 (ref 3.77–5.28)
SODIUM SERPL-SCNC: 144 MMOL/L (ref 136–145)
T4 FREE SERPL-MCNC: 1.38 NG/DL (ref 0.93–1.7)
TROPONIN T SERPL-MCNC: <0.01 NG/ML (ref 0–0.03)
TSH SERPL DL<=0.05 MIU/L-ACNC: 1.46 UIU/ML (ref 0.27–4.2)
WBC # BLD AUTO: 9.28 10*3/MM3 (ref 3.4–10.8)
WHOLE BLOOD HOLD SPECIMEN: NORMAL
WHOLE BLOOD HOLD SPECIMEN: NORMAL

## 2021-09-08 PROCEDURE — 80053 COMPREHEN METABOLIC PANEL: CPT | Performed by: EMERGENCY MEDICINE

## 2021-09-08 PROCEDURE — 93005 ELECTROCARDIOGRAM TRACING: CPT | Performed by: EMERGENCY MEDICINE

## 2021-09-08 PROCEDURE — 83735 ASSAY OF MAGNESIUM: CPT | Performed by: EMERGENCY MEDICINE

## 2021-09-08 PROCEDURE — 99283 EMERGENCY DEPT VISIT LOW MDM: CPT

## 2021-09-08 PROCEDURE — 93010 ELECTROCARDIOGRAM REPORT: CPT | Performed by: INTERNAL MEDICINE

## 2021-09-08 PROCEDURE — 84484 ASSAY OF TROPONIN QUANT: CPT | Performed by: EMERGENCY MEDICINE

## 2021-09-08 PROCEDURE — 84439 ASSAY OF FREE THYROXINE: CPT | Performed by: EMERGENCY MEDICINE

## 2021-09-08 PROCEDURE — 84443 ASSAY THYROID STIM HORMONE: CPT | Performed by: EMERGENCY MEDICINE

## 2021-09-08 PROCEDURE — 85379 FIBRIN DEGRADATION QUANT: CPT | Performed by: EMERGENCY MEDICINE

## 2021-09-08 PROCEDURE — 85025 COMPLETE CBC W/AUTO DIFF WBC: CPT | Performed by: EMERGENCY MEDICINE

## 2021-09-08 RX ORDER — MIDAZOLAM HYDROCHLORIDE 1 MG/ML
2.5 INJECTION, SOLUTION INTRAMUSCULAR; INTRAVENOUS ONCE
Status: DISCONTINUED | OUTPATIENT
Start: 2021-09-08 | End: 2021-09-08 | Stop reason: HOSPADM

## 2021-09-08 RX ORDER — MIDAZOLAM HYDROCHLORIDE 1 MG/ML
2.5 INJECTION INTRAMUSCULAR; INTRAVENOUS ONCE
Status: DISCONTINUED | OUTPATIENT
Start: 2021-09-08 | End: 2021-09-08

## 2021-09-08 RX ORDER — SODIUM CHLORIDE 0.9 % (FLUSH) 0.9 %
10 SYRINGE (ML) INJECTION AS NEEDED
Status: DISCONTINUED | OUTPATIENT
Start: 2021-09-08 | End: 2021-09-08 | Stop reason: HOSPADM

## 2021-09-08 NOTE — ED NOTES
Pt converted prior to sedation administration, see ekg. meds held, cardioversion held. Dr simental at bedside.      Jono Pedraza, JAQUELINE  09/08/21 [id number]

## 2021-09-09 LAB
QT INTERVAL: 312 MS
QTC INTERVAL: 486 MS

## 2021-09-09 NOTE — ED PROVIDER NOTES
Subjective   Patient says that about 10 AM this morning she felt her heart started racing and went into atrial fib.  She has had that before and has had 2 ablation therapies in the past.  She waited a while to see for the past but it did not.  She called her cardiologist in [place] and they told her to take half of one of her diltiazem that she has of a 30 mg dose.  She took gravity did not help so she came in to be checked out.  She denies any chest pain.  She says she does feel little short of breath with this palpitations.      History provided by:  Patient   used: No    Palpitations  Palpitations quality:  Fast  Onset quality:  Sudden  Duration:  4 hours  Timing:  Constant  Progression:  Unchanged  Chronicity:  Recurrent  Context: not anxiety, not appetite suppressants, not blood loss, not bronchodilators, not caffeine, not dehydration, not exercise, not hyperventilation, not illicit drugs, not nicotine and not stimulant use    Relieved by:  Nothing  Worsened by:  Nothing  Ineffective treatments:  None tried  Associated symptoms: shortness of breath    Associated symptoms: no back pain, no chest pain, no diaphoresis, no dizziness, no hemoptysis, no leg pain, no malaise/fatigue, no near-syncope and no numbness    Risk factors: hx of atrial fibrillation        Review of Systems   Constitutional: Negative.  Negative for diaphoresis and malaise/fatigue.   HENT: Negative.    Respiratory: Positive for shortness of breath. Negative for hemoptysis.    Cardiovascular: Positive for palpitations. Negative for chest pain and near-syncope.   Gastrointestinal: Negative.    Genitourinary: Negative.    Musculoskeletal: Negative.  Negative for back pain.   Skin: Negative.    Neurological: Negative.  Negative for dizziness and numbness.   Psychiatric/Behavioral: Negative.    All other systems reviewed and are negative.      Past Medical History:   Diagnosis Date   • A-fib (CMS/[facility])    • Hypertension    •  Urinary tract infection        Allergies   Allergen Reactions   • Latex Hives   • Sulfa Antibiotics Itching       Past Surgical History:   Procedure Laterality Date   • APPENDECTOMY     • BLADDER SURGERY     • EYE SURGERY     • FOOT FRACTURE SURGERY     • HYSTERECTOMY         Family History   Problem Relation Age of Onset   • Stroke Sister    • Hypertension Sister    • Stroke Mother    • Hypertension Mother    • Stroke Father    • Hypertension Father    • Arrhythmia Brother        Social History     Socioeconomic History   • Marital status:      Spouse name: Not on file   • Number of children: Not on file   • Years of education: Not on file   • Highest education level: Not on file   Tobacco Use   • Smoking status: Never Smoker   • Smokeless tobacco: Never Used   Vaping Use   • Vaping Use: Never used   Substance and Sexual Activity   • Alcohol use: No   • Drug use: No   • Sexual activity: Defer           Objective   Physical Exam  Vitals and nursing note reviewed.   Constitutional:       Appearance: Normal appearance.   HENT:      Head: Normocephalic and atraumatic.   Eyes:      Extraocular Movements: Extraocular movements intact.      Pupils: Pupils are equal, round, and reactive to light.   Cardiovascular:      Rate and Rhythm: Tachycardia present. Rhythm irregular.   Pulmonary:      Effort: Pulmonary effort is normal.      Breath sounds: Normal breath sounds.   Abdominal:      General: Abdomen is flat.      Palpations: Abdomen is soft.   Musculoskeletal:         General: Normal range of motion.      Cervical back: Normal range of motion and neck supple.   Skin:     General: Skin is warm and dry.   Neurological:      General: No focal deficit present.      Mental Status: She is alert and oriented to person, place, and time.   Psychiatric:         Mood and Affect: Mood normal.         Behavior: Behavior normal.         Procedures           ED Course  ED Course as of Sep 08 1916   Wed Sep 08, 2021   1912 As a  parent to cardiovert the patient and she suddenly converted by herself.  She has been in sinus rhythm since that time.  Her lab work is all fine.  We will let her go home but told her if it returns to come back.  At the present time she is stable.    [TR]      ED Course User Index  [TR] Zackery Schaefer Jr., MD                                           [facility]    Final diagnoses:   Paroxysmal atrial fibrillation (CMS/[facility])       ED Disposition  ED Disposition     ED Disposition Condition Comment    Discharge Stable           Harjeet Pedraza, DO  [address] [facility]  [address]  [facility] [address]  [phone]      If symptoms worsen         Medication List      No changes were made to your prescriptions during this visit.          Zackery Schaefer Jr., MD  09/08/21 1928

## 2021-09-24 DIAGNOSIS — N39.0 RECURRENT UTI: ICD-10-CM

## 2021-09-24 RX ORDER — NITROFURANTOIN MACROCRYSTALS 50 MG/1
50 CAPSULE ORAL NIGHTLY
Qty: 30 CAPSULE | Refills: 2 | Status: SHIPPED | OUTPATIENT
Start: 2021-09-24 | End: 2021-09-27 | Stop reason: SDUPTHER

## 2021-09-27 ENCOUNTER — TELEPHONE (OUTPATIENT)
Dept: UROLOGY | Facility: CLINIC | Age: 76
End: 2021-09-27

## 2021-09-27 DIAGNOSIS — N39.0 RECURRENT UTI: ICD-10-CM

## 2021-09-27 RX ORDER — NITROFURANTOIN MACROCRYSTALS 50 MG/1
50 CAPSULE ORAL NIGHTLY
Qty: 30 CAPSULE | Refills: 4 | Status: SHIPPED | OUTPATIENT
Start: 2021-09-27 | End: 2022-01-03 | Stop reason: SDUPTHER

## 2021-09-27 NOTE — TELEPHONE ENCOUNTER
Pt called in and stated that she didn't have enough refills to get through to February and wanted us to call in enough.  Sent in more refills.

## 2021-09-30 ENCOUNTER — OFFICE VISIT (OUTPATIENT)
Dept: CARDIOLOGY | Facility: CLINIC | Age: 76
End: 2021-09-30

## 2021-09-30 VITALS
WEIGHT: 157 LBS | BODY MASS INDEX: 25.23 KG/M2 | OXYGEN SATURATION: 98 % | DIASTOLIC BLOOD PRESSURE: 100 MMHG | HEIGHT: 66 IN | HEART RATE: 60 BPM | SYSTOLIC BLOOD PRESSURE: 148 MMHG

## 2021-09-30 DIAGNOSIS — I10 ESSENTIAL HYPERTENSION: ICD-10-CM

## 2021-09-30 DIAGNOSIS — I47.1 PSVT (PAROXYSMAL SUPRAVENTRICULAR TACHYCARDIA) (HCC): ICD-10-CM

## 2021-09-30 DIAGNOSIS — I48.0 PAROXYSMAL ATRIAL FIBRILLATION (HCC): Primary | ICD-10-CM

## 2021-09-30 PROCEDURE — 99214 OFFICE O/P EST MOD 30 MIN: CPT | Performed by: INTERNAL MEDICINE

## 2021-09-30 PROCEDURE — 93000 ELECTROCARDIOGRAM COMPLETE: CPT | Performed by: INTERNAL MEDICINE

## 2021-09-30 RX ORDER — ZINC GLUCONATE 50 MG
1 TABLET ORAL DAILY
COMMUNITY

## 2021-09-30 NOTE — PROGRESS NOTES
Subjective:     Encounter Date:09/30/2021      Patient ID: Vane Pyle is a 76 y.o. female with a history of paroxysmal atrial fibrillation, status post ablation on 2 occasions by Dr. Quinteros, also with hypertension, presenting today for follow-up.    Chief Complaint: Here today for follow-up of atrial fibrillation    This patient presents today for follow-up.  She was recently seen in the emergency department on 9/8/2021 after noting that her heart rate has been elevated for most of the day.  She says that this started abruptly, lasted most of the day, causing palpitations but no other significant symptoms other than some shortness of breath.  The patient says that she waited for quite some time to see if this would go away but it did not, therefore she presented for further evaluation.  The patient did take diltiazem at the time however this did not provide any heart rate reduction.  She was in the emergency department and a cardioversion was planned however she converted back into normal rhythm.  She was discharged home.  She had a similar episode on September 19 and she also self converted at that time.  No significant symptoms since that time.  She denies having lightheadedness, dizziness, syncope.  No chest pain, shortness of breath.  She remains anticoagulated with Xarelto.  She also has been on sotalol for a number of years.  No other recent changes in health.  No significant bleeding issues on sotalol.  Her blood pressure is elevated today but is generally well controlled.  She checks her blood pressure often and notes that it is generally very well controlled, however is slightly elevated today.  No current side effects from any medications.  Otherwise, she has been in her usual state of health with the exception of these few episodes of palpitations.    Atrial Fibrillation  Presents for follow-up visit. Symptoms include palpitations and tachycardia. Symptoms are negative for chest pain, dizziness,  hemodynamic instability, shortness of breath and syncope. Past medical history includes atrial fibrillation. There are no medication compliance problems.        Current Outpatient Medications:   •  B Complex Vitamins (B-COMPLEX/B-12 PO), Take  by mouth., Disp: , Rfl:   •  BIOTIN 5000 PO, Take  by mouth., Disp: , Rfl:   •  Calcium Carbonate-Vitamin D 600-200 MG-UNIT tablet, Calcium 600 with Vitamin D3 600 mg (1,500 mg)-200 unit tablet  Take 1 tablet every day by oral route., Disp: , Rfl:   •  Cholecalciferol (VITAMIN D3) 50 MCG (2000 UT) capsule, Take 2,000 Units by mouth Daily., Disp: , Rfl:   •  Cranberry 1000 MG capsule, Take  by mouth., Disp: , Rfl:   •  lisinopril (PRINIVIL,ZESTRIL) 20 MG tablet, Take 20 mg by mouth Daily., Disp: , Rfl:   •  Multiple Vitamins-Minerals (PRESERVISION AREDS 2 PO), Take  by mouth., Disp: , Rfl:   •  Multiple Vitamins-Minerals (ZINC PO), Take  by mouth., Disp: , Rfl:   •  nitrofurantoin (MACRODANTIN) 50 MG capsule, Take 1 capsule by mouth Every Night., Disp: 30 capsule, Rfl: 4  •  Omega-3 Fatty Acids (Fish Oil Double Strength) 1200 MG capsule, Take 3,600 mg by mouth 2 (two) times a day., Disp: , Rfl:   •  Potassium 99 MG tablet, Take  by mouth., Disp: , Rfl:   •  Premarin 0.625 MG/GM vaginal cream, Insert 0.5 g into the vagina Daily., Disp: , Rfl:   •  rivaroxaban (XARELTO) 20 MG tablet, Take 20 mg by mouth Daily., Disp: , Rfl:   •  sotalol (BETAPACE) 80 MG tablet, Take 80 mg by mouth 2 (Two) Times a Day., Disp: , Rfl:   •  vitamin E 400 UNIT capsule, Take 400 Units by mouth Daily., Disp: , Rfl:   •  Zinc 50 MG tablet, Take 1 tablet by mouth Daily., Disp: , Rfl:   •  dicyclomine (BENTYL) 10 MG capsule, Take 10 mg by mouth As Needed., Disp: , Rfl:   •  dilTIAZem (CARDIZEM) 30 MG tablet, Take 1 tablet by mouth As Needed (HR sustained over 150 bpm)., Disp: 30 tablet, Rfl: 0    Allergies   Allergen Reactions   • Latex Hives   • Sulfa Antibiotics Itching     Social History     Tobacco Use    • Smoking status: Never Smoker   • Smokeless tobacco: Never Used   Substance Use Topics   • Alcohol use: No     Review of Systems   Constitutional: Negative for fever and weight loss.   Cardiovascular: Positive for palpitations. Negative for chest pain, dyspnea on exertion, leg swelling, orthopnea, paroxysmal nocturnal dyspnea and syncope.   Respiratory: Negative for cough, shortness of breath and wheezing.    Hematologic/Lymphatic: Negative for adenopathy and bleeding problem.   Gastrointestinal: Negative for abdominal pain, nausea and vomiting.   Neurological: Negative for dizziness, headaches and loss of balance.       ECG 12 Lead    Date/Time: 9/30/2021 9:55 AM  Performed by: Ad Ahmadi MD  Authorized by: Ad Ahmadi MD   Comparison: compared with previous ECG from 9/8/2021  Comparison to previous ECG: Sinus rhythm has replaced SVT  Rhythm: sinus rhythm  Rate: normal  BPM: 62  Conduction: conduction normal  ST Segments: ST segments normal  T Waves: T waves normal  QRS axis: normal  Other findings: poor R wave progression    Clinical impression: non-specific ECG             Objective:     Vitals reviewed.   Constitutional:       General: Not in acute distress.     Appearance: Healthy appearance. Well-developed.   Eyes:      Extraocular Movements: Extraocular movements intact.      Pupils: Pupils are equal, round, and reactive to light.   HENT:      Head: Normocephalic and atraumatic.   Neck:      Thyroid: No thyroid mass.      Vascular: No JVD.   Pulmonary:      Effort: Pulmonary effort is normal.      Breath sounds: Normal breath sounds. No wheezing. No rhonchi. No rales.   Cardiovascular:      Normal rate. Regular rhythm.      Murmurs: There is no murmur.      No gallop.   Pulses:     Intact distal pulses.   Edema:     Peripheral edema absent.   Abdominal:      General: Bowel sounds are normal. There is no distension.      Palpations: Abdomen is soft.      Tenderness: There is no  "abdominal tenderness.   Musculoskeletal: Normal range of motion.      Extremities: No clubbing present.     Cervical back: Normal range of motion and neck supple. Skin:     General: Skin is warm and dry. There is no cyanosis.      Findings: No erythema or rash.   Neurological:      Mental Status: Alert and oriented to person, place, and time.      Cranial Nerves: No cranial nerve deficit.       /100   Pulse 60   Ht 167.6 cm (66\")   Wt 71.2 kg (157 lb)   SpO2 98%   BMI 25.34 kg/m²     Data/Lab Review:     Lab Results   Component Value Date    WBC 9.28 09/08/2021    HGB 14.7 09/08/2021    HCT 43.3 09/08/2021    MCV 89.3 09/08/2021     09/08/2021     Lab Results   Component Value Date    GLUCOSE 122 (H) 09/08/2021    CALCIUM 9.1 09/08/2021     09/08/2021    K 3.9 09/08/2021    CO2 24.0 09/08/2021     (H) 09/08/2021    BUN 18 09/08/2021    CREATININE 0.71 09/08/2021    EGFRIFNONA 80 09/08/2021    BCR 25.4 (H) 09/08/2021    ANIONGAP 11.0 09/08/2021     Lab Results   Component Value Date    TSH 1.460 09/08/2021     I reviewed rhythm strips from 9/8/2021 which show a narrow complex irregularly irregular rhythm, suggestive of atrial fibrillation, however the ECG on the same date shows a narrow complex regular tachycardia, most likely consistent with supraventricular tachycardia of some variety, whether this is 2: 1 atrial flutter versus AVNRT is unclear from the ECG.        Assessment:          Diagnosis Plan   1. Paroxysmal atrial fibrillation (HCC)  Holter Monitor - 72 Hour Up To 15 Days    ECG 12 Lead   2. Essential hypertension     3. PSVT (paroxysmal supraventricular tachycardia) ([facility])  Holter Monitor - 72 Hour Up To 15 Days    dilTIAZem (CARDIZEM) 30 MG tablet        Plan:       1.  Paroxysmal atrial fibrillation: The patient presented the emergency department with tachycardia.  The rhythm strips from this date appear to be consistent with an irregular rhythm, likely atrial fibrillation " but the ECG on the same day shows a narrow complex regular tachycardia, either consistent with possible atrial flutter or SVT.  She remains anticoagulated and on sotalol.  She also has follow-up with Dr. Quinteros in the near future.  Given a recurrence of symptoms since her ER visit, we will place her in a cardiac monitor today for further evaluation.    2.  Essential hypertension: Blood pressure is elevated today but is generally well controlled.  Continue current medications and continue blood pressure monitoring.    3.  Paroxysmal SVT: Whether the patient had AVNRT or 2: 1 atrial flutter is unclear from the ECG.  My suspicion is that this could be atrial flutter, however she did self convert in the emergency department which might be somewhat unusual for atrial flutter.  In any event, we will place her in a cardiac monitor today.  She also has close follow-up with Dr. Quinteros in the near future.    The patient has a follow-up scheduled with me already and she will be instructed to keep this follow-up and we will await the results of her cardiac monitor and make further plans at that time if needed.

## 2021-10-19 DIAGNOSIS — I47.1 PSVT (PAROXYSMAL SUPRAVENTRICULAR TACHYCARDIA) (HCC): ICD-10-CM

## 2021-11-05 ENCOUNTER — TELEPHONE (OUTPATIENT)
Dept: CARDIOLOGY | Facility: CLINIC | Age: 76
End: 2021-11-05

## 2022-01-03 DIAGNOSIS — N39.0 RECURRENT UTI: ICD-10-CM

## 2022-01-03 RX ORDER — NITROFURANTOIN MACROCRYSTALS 50 MG/1
50 CAPSULE ORAL NIGHTLY
Qty: 30 CAPSULE | Refills: 4 | Status: SHIPPED | OUTPATIENT
Start: 2022-01-03 | End: 2022-12-02

## 2022-01-20 ENCOUNTER — TELEPHONE (OUTPATIENT)
Dept: UROLOGY | Facility: CLINIC | Age: 77
End: 2022-01-20

## 2022-01-20 NOTE — TELEPHONE ENCOUNTER
Called pt and rescheduled appointment 02/01/2022 until 02/17/2022  Pt transferred to central scheduling to reschedule renal US

## 2022-02-01 ENCOUNTER — APPOINTMENT (OUTPATIENT)
Dept: ULTRASOUND IMAGING | Facility: HOSPITAL | Age: 77
End: 2022-02-01

## 2022-02-15 NOTE — PROGRESS NOTES
Subjective    Ms. Pyle is 76 y.o. female    Chief Complaint: 6 Month follow up for Recurrent UTI.    History of Present Illness  Patient with history of recurrent UTI here for 6-month follow-up she has had excellent response to the Premarin cream and daily nitrofurantoin.  She has had no documented infection since last seen.  She had cystoscopy done 08/03/2020 was negative for any abnormality she does have history of right renal cyst she got renal ultrasound prior to her appointment today.    The following portions of the patient's history were reviewed and updated as appropriate: allergies, current medications, past family history, past medical history, past social history, past surgical history and problem list.    Review of Systems   Constitutional: Negative for chills and fever.   Gastrointestinal: Negative for abdominal pain, anal bleeding and blood in stool.   Genitourinary: Negative for decreased urine volume, difficulty urinating, dyspareunia, dysuria, enuresis, flank pain, frequency, genital sores, hematuria, menstrual problem, pelvic pain, urgency, vaginal bleeding, vaginal discharge and vaginal pain.         Current Outpatient Medications:   •  B Complex Vitamins (B-COMPLEX/B-12 PO), Take  by mouth., Disp: , Rfl:   •  BIOTIN 5000 PO, Take  by mouth., Disp: , Rfl:   •  Calcium Carbonate-Vitamin D 600-200 MG-UNIT tablet, Calcium 600 with Vitamin D3 600 mg (1,500 mg)-200 unit tablet  Take 1 tablet every day by oral route., Disp: , Rfl:   •  Cholecalciferol (VITAMIN D3) 50 MCG (2000 UT) capsule, Take 2,000 Units by mouth Daily., Disp: , Rfl:   •  Cranberry 1000 MG capsule, Take  by mouth., Disp: , Rfl:   •  dicyclomine (BENTYL) 10 MG capsule, Take 10 mg by mouth As Needed., Disp: , Rfl:   •  dilTIAZem (CARDIZEM) 30 MG tablet, TAKE 1 TABLET BY MOUTH AS NEEDED (HR SUSTAINED OVER 150 BPM)., Disp: 90 tablet, Rfl: 4  •  lisinopril (PRINIVIL,ZESTRIL) 20 MG tablet, Take 20 mg by mouth Daily., Disp: , Rfl:   •   "Multiple Vitamins-Minerals (PRESERVISION AREDS 2 PO), Take  by mouth., Disp: , Rfl:   •  Multiple Vitamins-Minerals (ZINC PO), Take  by mouth., Disp: , Rfl:   •  nitrofurantoin (MACRODANTIN) 50 MG capsule, Take 1 capsule by mouth Every Night., Disp: 30 capsule, Rfl: 4  •  Omega-3 Fatty Acids (Fish Oil Double Strength) 1200 MG capsule, Take 3,600 mg by mouth 2 (two) times a day., Disp: , Rfl:   •  Potassium 99 MG tablet, Take  by mouth., Disp: , Rfl:   •  Premarin 0.625 MG/GM vaginal cream, Insert 0.5 g into the vagina Daily., Disp: , Rfl:   •  rivaroxaban (XARELTO) 20 MG tablet, Take 20 mg by mouth Daily., Disp: , Rfl:   •  sotalol (BETAPACE) 80 MG tablet, Take 80 mg by mouth 2 (Two) Times a Day., Disp: , Rfl:   •  vitamin E 400 UNIT capsule, Take 400 Units by mouth Daily., Disp: , Rfl:   •  Zinc 50 MG tablet, Take 1 tablet by mouth Daily., Disp: , Rfl:     Past Medical History:   Diagnosis Date   • A-fib (HCC)    • Hypertension    • Urinary tract infection        Past Surgical History:   Procedure Laterality Date   • APPENDECTOMY     • BLADDER SURGERY     • EYE SURGERY     • FOOT FRACTURE SURGERY     • HYSTERECTOMY         Social History     Socioeconomic History   • Marital status:    Tobacco Use   • Smoking status: Never Smoker   • Smokeless tobacco: Never Used   Vaping Use   • Vaping Use: Never used   Substance and Sexual Activity   • Alcohol use: No   • Drug use: No   • Sexual activity: Defer       Family History   Problem Relation Age of Onset   • Stroke Sister    • Hypertension Sister    • Stroke Mother    • Hypertension Mother    • Stroke Father    • Hypertension Father    • Arrhythmia Brother        Objective    Temp 98.3 °F (36.8 °C)   Ht 167.6 cm (65.98\")   Wt 71.2 kg (157 lb)   BMI 25.35 kg/m²     Physical Exam  Vitals reviewed.   Constitutional:       Appearance: Normal appearance.   HENT:      Head: Normocephalic and atraumatic.      Nose: No congestion.   Neck:      Comments: No obvious neck " masses observed  Pulmonary:      Effort: Pulmonary effort is normal.   Skin:     Coloration: Skin is not pale.   Neurological:      Mental Status: She is alert and oriented to person, place, and time.   Psychiatric:         Mood and Affect: Mood normal.         Behavior: Behavior normal.             Results for orders placed or performed in visit on 02/17/22   POC Urinalysis Dipstick, Multipro    Specimen: Urine   Result Value Ref Range    Color Yellow Yellow, Straw, Dark Yellow, Opal    Clarity, UA Clear Clear    Glucose, UA Negative Negative, 1000 mg/dL (3+) mg/dL    Bilirubin Negative Negative    Ketones, UA Negative Negative    Specific Gravity  1.020 1.005 - 1.030    Blood, UA Negative Negative    pH, Urine 7.0 5.0 - 8.0    Protein, POC Negative Negative mg/dL    Urobilinogen, UA Normal Normal    Nitrite, UA Negative Negative    Leukocytes Negative Negative     Renal ultrasound independent review:  I inspected both kidneys I personally reviewed the images ultrasound no abnormalities associated with the contour or shape of the kidney.  There is no hydronephrosis noted in either kidney no solid masses are seen no obvious kidney stones.  There is a benign-appearing 1.3 cm cyst in the right kidney.    Assessment and Plan    Diagnoses and all orders for this visit:    1. Recurrent UTI (Primary)  -     POC Urinalysis Dipstick, Multipro  -     US Renal Bilateral; Future    2. Renal cyst, right  -     US Renal Bilateral; Future    Patient has had no to UTIs she has had excellent response to Premarin and daily nitrofurantoin at this point I will have her stop the daily nitrofurantoin continue the Premarin cream 3 times a week and follow-up in 1 year.    Ultrasound shows a stable 1.3 cm benign cyst we will check ultrasound in 1 year if stable or no change no further imaging indicated for this cyst.

## 2022-02-17 ENCOUNTER — OFFICE VISIT (OUTPATIENT)
Dept: UROLOGY | Facility: CLINIC | Age: 77
End: 2022-02-17

## 2022-02-17 ENCOUNTER — HOSPITAL ENCOUNTER (OUTPATIENT)
Dept: ULTRASOUND IMAGING | Facility: HOSPITAL | Age: 77
Discharge: HOME | End: 2022-02-17
Admitting: PHYSICIAN ASSISTANT

## 2022-02-17 VITALS — TEMPERATURE: 98.3 F | HEIGHT: 66 IN | WEIGHT: 157 LBS | BODY MASS INDEX: 25.23 KG/M2

## 2022-02-17 DIAGNOSIS — N39.0 RECURRENT UTI: Primary | ICD-10-CM

## 2022-02-17 DIAGNOSIS — N28.1 CYST OF RIGHT KIDNEY: ICD-10-CM

## 2022-02-17 DIAGNOSIS — N28.1 RENAL CYST, RIGHT: ICD-10-CM

## 2022-02-17 LAB
BILIRUB BLD-MCNC: NEGATIVE MG/DL
CLARITY, POC: CLEAR
COLOR UR: YELLOW
GLUCOSE UR STRIP-MCNC: NEGATIVE MG/DL
KETONES UR QL: NEGATIVE
LEUKOCYTE EST, POC: NEGATIVE
NITRITE UR-MCNC: NEGATIVE MG/ML
PH UR: 7 [PH] (ref 5–8)
PROT UR STRIP-MCNC: NEGATIVE MG/DL
RBC # UR STRIP: NEGATIVE /UL
SP GR UR: 1.02 (ref 1–1.03)
UROBILINOGEN UR QL: NORMAL

## 2022-02-17 PROCEDURE — 76775 US EXAM ABDO BACK WALL LIM: CPT

## 2022-02-17 PROCEDURE — 81001 URINALYSIS AUTO W/SCOPE: CPT | Performed by: PHYSICIAN ASSISTANT

## 2022-02-17 PROCEDURE — 99213 OFFICE O/P EST LOW 20 MIN: CPT | Performed by: PHYSICIAN ASSISTANT

## 2022-02-24 DIAGNOSIS — N95.2 ATROPHIC VAGINITIS: Primary | ICD-10-CM

## 2022-02-24 RX ORDER — CONJUGATED ESTROGENS 0.62 MG/G
CREAM VAGINAL
Qty: 1 EACH | Refills: 11 | Status: SHIPPED | OUTPATIENT
Start: 2022-02-24

## 2022-03-10 RX ORDER — NITROFURANTOIN MACROCRYSTALS 50 MG/1
50 CAPSULE ORAL NIGHTLY
Qty: 30 CAPSULE | Refills: 2 | Status: SHIPPED | OUTPATIENT
Start: 2022-03-10 | End: 2022-12-02

## 2022-03-23 ENCOUNTER — TELEPHONE (OUTPATIENT)
Dept: UROLOGY | Facility: CLINIC | Age: 77
End: 2022-03-23

## 2022-03-23 DIAGNOSIS — N95.2 ATROPHIC VAGINITIS: Primary | ICD-10-CM

## 2022-03-23 RX ORDER — CONJUGATED ESTROGENS 0.62 MG/G
0.5 CREAM VAGINAL DAILY
Qty: 0.05 G | Refills: 11 | Status: SHIPPED | OUTPATIENT
Start: 2022-03-23

## 2022-03-23 NOTE — TELEPHONE ENCOUNTER
Tried to call the patient to let her know that I did the pa on her cream and that the results came back that she didn't need the pa however I had to leave a message for the patient on her answering machine

## 2022-06-15 ENCOUNTER — OFFICE VISIT (OUTPATIENT)
Dept: CARDIOLOGY | Facility: CLINIC | Age: 77
End: 2022-06-15

## 2022-06-15 VITALS
HEART RATE: 54 BPM | DIASTOLIC BLOOD PRESSURE: 74 MMHG | BODY MASS INDEX: 25.23 KG/M2 | WEIGHT: 157 LBS | SYSTOLIC BLOOD PRESSURE: 140 MMHG | HEIGHT: 66 IN | OXYGEN SATURATION: 99 %

## 2022-06-15 DIAGNOSIS — I10 PRIMARY HYPERTENSION: ICD-10-CM

## 2022-06-15 DIAGNOSIS — I48.0 PAROXYSMAL ATRIAL FIBRILLATION: Primary | ICD-10-CM

## 2022-06-15 DIAGNOSIS — I47.1 PSVT (PAROXYSMAL SUPRAVENTRICULAR TACHYCARDIA): ICD-10-CM

## 2022-06-15 PROCEDURE — 93000 ELECTROCARDIOGRAM COMPLETE: CPT | Performed by: INTERNAL MEDICINE

## 2022-06-15 PROCEDURE — 99214 OFFICE O/P EST MOD 30 MIN: CPT | Performed by: INTERNAL MEDICINE

## 2022-06-15 RX ORDER — LANOLIN ALCOHOL/MO/W.PET/CERES
5000 CREAM (GRAM) TOPICAL DAILY
COMMUNITY

## 2022-06-15 NOTE — PROGRESS NOTES
Subjective:     Encounter Date:06/15/2022      Patient ID: Vane Pyle is a 76 y.o. female with paroxysmal atrial fibrillation, status post previous ablation on two occasions by Dr. Quinteros, hypertension, presenting today for routine follow-up.    Chief Complaint: Here today for follow-up of atrial fibrillation    This patient presents today for routine follow-up.  She has been doing reasonably well.  She continues to have some intermittent episodes of elevated heart rate and palpitations.  She is a patient with a history of paroxysmal atrial fibrillation and has undergone 2 separate ablations by Dr. Quinteros.  She remains on antiarrhythmic therapy as well as anticoagulant therapy.  If her heart rate becomes elevated and she is symptomatic, she has been given instructions in the past to take Cardizem as needed.  Generally, when her heart rate does elevate and stays elevated, Cardizem will bring it back down accordingly in a relatively short amount of time.  The patient denies having any chest pain, significant shortness of breath, dyspnea on exertion.  No syncopal episodes.  Blood pressure has been well controlled on current medications.  No side effects of any medications, including side effects from sotalol or any significant bleeding issues on Xarelto.    Atrial Fibrillation  Presents for follow-up visit. Symptoms include palpitations. Symptoms are negative for chest pain, dizziness, hemodynamic instability, shortness of breath and syncope. The symptoms have been stable. Past medical history includes atrial fibrillation. There are no medication compliance problems.         Current Outpatient Medications:   •  BIOTIN 5000 PO, Take  by mouth., Disp: , Rfl:   •  Calcium Carbonate-Vitamin D 600-200 MG-UNIT tablet, Calcium 600 with Vitamin D3 600 mg (1,500 mg)-200 unit tablet  Take 1 tablet every day by oral route., Disp: , Rfl:   •  Cholecalciferol (VITAMIN D3) 50 MCG (2000 UT) capsule, Take 2,000 Units by  mouth Daily., Disp: , Rfl:   •  Cranberry 1000 MG capsule, Take  by mouth., Disp: , Rfl:   •  dicyclomine (BENTYL) 10 MG capsule, Take 10 mg by mouth As Needed., Disp: , Rfl:   •  dilTIAZem (CARDIZEM) 30 MG tablet, Take 1 tablet by mouth As Needed (HR sustained over 150 bpm)., Disp: 90 tablet, Rfl: 4  •  lisinopril (PRINIVIL,ZESTRIL) 20 MG tablet, Take 20 mg by mouth Daily., Disp: , Rfl:   •  Multiple Vitamins-Minerals (PRESERVISION AREDS 2 PO), Take 1 tablet by mouth Daily., Disp: , Rfl:   •  Multiple Vitamins-Minerals (ZINC PO), Take  by mouth., Disp: , Rfl:   •  nitrofurantoin (MACRODANTIN) 50 MG capsule, Take 1 capsule by mouth Every Night., Disp: 30 capsule, Rfl: 4  •  Omega-3 Fatty Acids (Fish Oil Double Strength) 1200 MG capsule, Take 3,600 mg by mouth 2 (two) times a day., Disp: , Rfl:   •  Potassium 99 MG tablet, Take  by mouth., Disp: , Rfl:   •  Premarin 0.625 MG/GM vaginal cream, Insert 0.5 g into the vagina Daily., Disp: 0.05 g, Rfl: 11  •  rivaroxaban (XARELTO) 20 MG tablet, Take 20 mg by mouth Daily., Disp: , Rfl:   •  sotalol (BETAPACE) 80 MG tablet, Take 80 mg by mouth 2 (Two) Times a Day., Disp: , Rfl:   •  vitamin B-12 (CYANOCOBALAMIN) 1000 MCG tablet, Take 5,000 mcg by mouth Daily., Disp: , Rfl:   •  vitamin E 400 UNIT capsule, Take 400 Units by mouth Daily., Disp: , Rfl:   •  Zinc 50 MG tablet, Take 1 tablet by mouth Daily., Disp: , Rfl:   •  conjugated estrogens (Premarin) 0.625 MG/GM vaginal cream, Insert 0,5gm Monday, Wednesday and Friday, Disp: 1 each, Rfl: 11  •  nitrofurantoin (MACRODANTIN) 50 MG capsule, Take 1 capsule by mouth Every Night., Disp: 30 capsule, Rfl: 2    Allergies   Allergen Reactions   • Latex Hives   • Sulfa Antibiotics Itching     Social History     Tobacco Use   • Smoking status: Never Smoker   • Smokeless tobacco: Never Used   Substance Use Topics   • Alcohol use: No     Review of Systems   Constitutional: Negative for fever and weight loss.   Cardiovascular: Positive  for palpitations. Negative for chest pain, dyspnea on exertion, leg swelling, orthopnea, paroxysmal nocturnal dyspnea and syncope.   Respiratory: Negative for cough, shortness of breath and wheezing.    Hematologic/Lymphatic: Negative for adenopathy and bleeding problem.   Gastrointestinal: Negative for abdominal pain, hematemesis, hematochezia, melena, nausea and vomiting.   Neurological: Negative for dizziness, headaches and loss of balance.         ECG 12 Lead    Date/Time: 6/15/2022 1:06 PM  Performed by: Ad Ahmadi MD  Authorized by: Ad Ahmadi MD   Comparison: compared with previous ECG from 9/30/2021  Similar to previous ECG  Rhythm: sinus bradycardia  Rate: bradycardic  BPM: 59  Conduction: conduction normal  QRS axis: normal  Other findings: poor R wave progression    Clinical impression: non-specific ECG               Objective:     Vitals reviewed.   Constitutional:       General: Not in acute distress.     Appearance: Well-developed and not in distress.   Eyes:      Extraocular Movements: Extraocular movements intact.   HENT:      Head: Normocephalic and atraumatic.   Neck:      Thyroid: No thyroid mass.      Vascular: No JVD.   Pulmonary:      Effort: Pulmonary effort is normal.      Breath sounds: Normal breath sounds. No wheezing. No rhonchi. No rales.   Cardiovascular:      Normal rate. Regular rhythm.      Murmurs: There is no murmur.      No gallop.   Pulses:     Intact distal pulses.   Edema:     Peripheral edema absent.   Abdominal:      General: Bowel sounds are normal. There is no distension.      Palpations: Abdomen is soft.      Tenderness: There is no abdominal tenderness.   Musculoskeletal:      Extremities: No clubbing present.Skin:     General: Skin is warm and dry. There is no cyanosis.      Findings: No erythema or rash.   Neurological:      Mental Status: Alert and oriented to person, place, and time.      Cranial Nerves: No cranial nerve deficit.       BP  "140/74   Pulse 54   Ht 167.6 cm (65.98\")   Wt 71.2 kg (157 lb)   SpO2 99%   BMI 25.36 kg/m²     Data/Lab Review:     14-day cardiac monitor from 9/30/2021:  · An abnormal monitor study.  · The predominant rhythm noted during the testing period was sinus rhythm.  · Average HR: 62 bpm.  · Premature atrial contractions occured rarely.  · There were 9 short runs of what was labeled SVT, however the QRS complexes appear to be in an irregularly irregular pattern, more consistent with short runs of atrial fibrillation. The longest such runs is just over 19 seconds in duration.  · Premature ventricular contractions occured rarely.  · Reported symptoms generally had no correlation to cardiac rhythm although at times did correlate to isolated supraventricular ectopic beats and one short run of what appears to be atrial fibrillation, only lasting a few beats.    Lab Results   Component Value Date    TSH 1.460 09/08/2021         Assessment:          Diagnosis Plan   1. Paroxysmal atrial fibrillation (HCC)  ECG 12 Lead    dilTIAZem (CARDIZEM) 30 MG tablet   2. Primary hypertension     3. PSVT (paroxysmal supraventricular tachycardia) (HCC)            Plan:       1.  Paroxysmal atrial fibrillation: Overall, relatively stable at this time.  The patient continues to have some intermittent episodes of tachycardia.  She has an kris on her phone which tells her that occasionally this might be atrial fibrillation.  However, she is chronically anticoagulated.  Her symptoms are relatively spaced out at this time but may be have become slightly more frequent since we last saw her.  She will continue to monitor for recurrent symptoms.  If symptoms increase in frequency to the point where she is uncomfortable with the symptoms, she will either discuss further with us or Dr. Quinteros.  She does have follow-up with Dr. Quinteros in November.  She will continue current medications otherwise.  We will refill Cardizem to take as needed which " has been prescribed for a number of years.     2.  Primary hypertension: Blood pressure is generally well controlled.  Continue current medication.     3.  Paroxysmal SVT: The patient previously had an ECG showing possible SVT versus atrial flutter.  She also had a cardiac monitor which is referenced above.  She continues to follow closely with Dr. Quinteros.  We discussed that her elevated heart rates may very well be recurrent atrial fibrillation but could also certainly be something like SVT or atrial flutter.  Therefore, if symptoms increase in frequency, even though she might not be an ideal candidate for a repeat attempt at ablation of A. fib, it may be worth considering evaluating for any other type of rhythm abnormalities.    We will plan to see the patient back in 6 months unless otherwise needed sooner.

## 2022-08-30 ENCOUNTER — TELEPHONE (OUTPATIENT)
Dept: UROLOGY | Facility: CLINIC | Age: 77
End: 2022-08-30

## 2022-08-30 NOTE — TELEPHONE ENCOUNTER
Caller: TENA MARIO    Relationship to patient: SELF    Best call back number: [phone]    Patient is needing: PT IS WANTING A CALL BACK BC SHE IS HAVING A RECURRENT UTI AND SHE WANTS TO KNOW IF SHE SHOULD DOUBLE UP ON HER MEDS OR SHOULD SHE COME IN.

## 2022-09-02 ENCOUNTER — OFFICE VISIT (OUTPATIENT)
Dept: UROLOGY | Facility: CLINIC | Age: 77
End: 2022-09-02

## 2022-09-02 VITALS — WEIGHT: 156.4 LBS | HEIGHT: 66 IN | BODY MASS INDEX: 25.13 KG/M2

## 2022-09-02 DIAGNOSIS — N39.0 RECURRENT UTI: Primary | ICD-10-CM

## 2022-09-02 LAB
BILIRUB BLD-MCNC: NEGATIVE MG/DL
CLARITY, POC: CLEAR
COLOR UR: ABNORMAL
GLUCOSE UR STRIP-MCNC: NEGATIVE MG/DL
KETONES UR QL: ABNORMAL
LEUKOCYTE EST, POC: ABNORMAL
NITRITE UR-MCNC: NEGATIVE MG/ML
PH UR: 5.5 [PH] (ref 5–8)
PROT UR STRIP-MCNC: NEGATIVE MG/DL
RBC # UR STRIP: NEGATIVE /UL
SP GR UR: 1.02 (ref 1–1.03)
UROBILINOGEN UR QL: ABNORMAL

## 2022-09-02 PROCEDURE — 81001 URINALYSIS AUTO W/SCOPE: CPT

## 2022-09-02 PROCEDURE — 99213 OFFICE O/P EST LOW 20 MIN: CPT

## 2022-12-02 ENCOUNTER — OFFICE VISIT (OUTPATIENT)
Dept: UROLOGY | Facility: CLINIC | Age: 77
End: 2022-12-02

## 2022-12-02 VITALS — WEIGHT: 157.4 LBS | BODY MASS INDEX: 25.41 KG/M2 | TEMPERATURE: 98 F

## 2022-12-02 DIAGNOSIS — N39.0 RECURRENT UTI: Primary | ICD-10-CM

## 2022-12-02 LAB
BILIRUB BLD-MCNC: NEGATIVE MG/DL
CLARITY, POC: CLEAR
COLOR UR: YELLOW
GLUCOSE UR STRIP-MCNC: NEGATIVE MG/DL
KETONES UR QL: ABNORMAL
LEUKOCYTE EST, POC: ABNORMAL
NITRITE UR-MCNC: NEGATIVE MG/ML
PH UR: 5.5 [PH] (ref 5–8)
PROT UR STRIP-MCNC: NEGATIVE MG/DL
RBC # UR STRIP: NEGATIVE /UL
SP GR UR: 1.02 (ref 1–1.03)
UROBILINOGEN UR QL: ABNORMAL

## 2022-12-02 PROCEDURE — 81001 URINALYSIS AUTO W/SCOPE: CPT

## 2022-12-02 PROCEDURE — 99213 OFFICE O/P EST LOW 20 MIN: CPT

## 2022-12-02 RX ORDER — AMIODARONE HYDROCHLORIDE 200 MG/1
100 TABLET ORAL DAILY
COMMUNITY
Start: 2022-11-18

## 2022-12-02 RX ORDER — NITROFURANTOIN MACROCRYSTALS 50 MG/1
50 CAPSULE ORAL NIGHTLY
Qty: 90 CAPSULE | Refills: 0 | Status: SHIPPED | OUTPATIENT
Start: 2022-12-02

## 2023-02-09 DIAGNOSIS — N39.0 RECURRENT UTI: ICD-10-CM

## 2023-02-10 RX ORDER — NITROFURANTOIN MACROCRYSTALS 50 MG/1
50 CAPSULE ORAL NIGHTLY
Qty: 90 CAPSULE | Refills: 0 | OUTPATIENT
Start: 2023-02-10

## 2023-02-20 ENCOUNTER — TELEPHONE (OUTPATIENT)
Dept: UROLOGY | Facility: CLINIC | Age: 78
End: 2023-02-20
Payer: MEDICARE

## 2023-02-20 NOTE — TELEPHONE ENCOUNTER
Patient called stating that she feels like she has a UTI. She said she doubled up on her nitrofurantoin, but was unsure if she needed to continue doing that, I informed her to not do that, that we can get her a sooner appointment.  She said that was fine- got her scheduled for tomorrow

## 2023-02-20 NOTE — PROGRESS NOTES
Subjective    Ms. Pyle is 77 y.o. female    Chief Complaint: acute UTI symptoms, increased frequency    History of Present Illness    77-year-old female established patient in with acute urinary tract infection symptoms consisted mainly of increased urinary frequency.  Patient reports symptoms started approximately 2 weeks ago and has continued to worsen to the point for the last week patient states that she has been up 4 times nightly in addition to every 45 minutes throughout the day. Pt remains on nitrofurantoin 50mg daily as well as cranberry tablet for recurrent UTI hx. reports has not been using the Premarin cream as frequently as prescribed.  Patient denies any fever, chills, nausea vomiting, flank pain or hematuria.       The following portions of the patient's history were reviewed and updated as appropriate: allergies, current medications, past family history, past medical history, past social history, past surgical history and problem list.    Review of Systems   Constitutional: Positive for fatigue. Negative for chills and fever.   Gastrointestinal: Negative for abdominal pain, anal bleeding, blood in stool, nausea and vomiting.   Genitourinary: Positive for frequency and urgency. Negative for decreased urine volume, difficulty urinating, dysuria, flank pain, hematuria, pelvic pain and vaginal pain.         Current Outpatient Medications:   •  amiodarone (PACERONE) 200 MG tablet, Take 100 mg by mouth Daily., Disp: , Rfl:   •  BIOTIN 5000 PO, Take  by mouth., Disp: , Rfl:   •  Calcium Carbonate-Vitamin D 600-200 MG-UNIT tablet, Calcium 600 with Vitamin D3 600 mg (1,500 mg)-200 unit tablet  Take 1 tablet every day by oral route., Disp: , Rfl:   •  Cholecalciferol (VITAMIN D3) 50 MCG (2000 UT) capsule, Take 2,000 Units by mouth Daily., Disp: , Rfl:   •  conjugated estrogens (Premarin) 0.625 MG/GM vaginal cream, Insert 0,5gm Monday, Wednesday and Friday, Disp: 1 each, Rfl: 11  •  Cranberry 1000 MG capsule,  Take  by mouth., Disp: , Rfl:   •  dicyclomine (BENTYL) 10 MG capsule, Take 10 mg by mouth As Needed., Disp: , Rfl:   •  dilTIAZem (CARDIZEM) 30 MG tablet, Take 1 tablet by mouth As Needed (HR sustained over 150 bpm)., Disp: 90 tablet, Rfl: 4  •  lisinopril (PRINIVIL,ZESTRIL) 20 MG tablet, Take 20 mg by mouth Daily., Disp: , Rfl:   •  Multiple Vitamins-Minerals (PRESERVISION AREDS 2 PO), Take 1 tablet by mouth Daily., Disp: , Rfl:   •  Multiple Vitamins-Minerals (ZINC PO), Take  by mouth., Disp: , Rfl:   •  nitrofurantoin (MACRODANTIN) 50 MG capsule, Take 1 capsule by mouth Every Night., Disp: 90 capsule, Rfl: 0  •  Omega-3 Fatty Acids (Fish Oil Double Strength) 1200 MG capsule, Take 3,600 mg by mouth 2 (two) times a day., Disp: , Rfl:   •  Potassium 99 MG tablet, Take  by mouth., Disp: , Rfl:   •  Premarin 0.625 MG/GM vaginal cream, Insert 0.5 g into the vagina Daily., Disp: 0.05 g, Rfl: 11  •  rivaroxaban (XARELTO) 20 MG tablet, Take 20 mg by mouth Daily., Disp: , Rfl:   •  sotalol (BETAPACE) 80 MG tablet, Take 80 mg by mouth 2 (Two) Times a Day., Disp: , Rfl:   •  vitamin B-12 (CYANOCOBALAMIN) 1000 MCG tablet, Take 5,000 mcg by mouth Daily., Disp: , Rfl:   •  vitamin E 400 UNIT capsule, Take 400 Units by mouth Daily., Disp: , Rfl:   •  Zinc 50 MG tablet, Take 1 tablet by mouth Daily., Disp: , Rfl:     Past Medical History:   Diagnosis Date   • A-fib (HCC)    • Hypertension    • Urinary tract infection        Past Surgical History:   Procedure Laterality Date   • APPENDECTOMY     • BLADDER SURGERY     • EYE SURGERY     • FOOT FRACTURE SURGERY     • HYSTERECTOMY         Social History     Socioeconomic History   • Marital status:    Tobacco Use   • Smoking status: Never   • Smokeless tobacco: Never   Vaping Use   • Vaping Use: Never used   Substance and Sexual Activity   • Alcohol use: No   • Drug use: No   • Sexual activity: Defer       Family History   Problem Relation Age of Onset   • Stroke Sister    •  "Hypertension Sister    • Stroke Mother    • Hypertension Mother    • Stroke Father    • Hypertension Father    • Arrhythmia Brother        Objective    Temp 97.7 °F (36.5 °C)   Ht 167.6 cm (66\")   Wt 72.7 kg (160 lb 3.2 oz)   BMI 25.86 kg/m²     Physical Exam  Nursing note reviewed.   Constitutional:       General: She is not in acute distress.     Appearance: Normal appearance. She is not ill-appearing.   HENT:      Nose: No congestion.   Abdominal:      Tenderness: There is no right CVA tenderness or left CVA tenderness.      Hernia: No hernia is present.   Skin:     General: Skin is warm and dry.   Neurological:      Mental Status: She is alert and oriented to person, place, and time.   Psychiatric:         Mood and Affect: Mood normal.         Behavior: Behavior normal.             Results for orders placed or performed in visit on 02/21/23   POC Urinalysis Dipstick, Multipro    Specimen: Urine   Result Value Ref Range    Color Yellow Yellow, Straw, Dark Yellow, Opal    Clarity, UA Clear Clear    Glucose, UA Negative Negative mg/dL    Bilirubin Negative Negative    Ketones, UA Negative Negative    Specific Gravity  1.025 1.005 - 1.030    Blood, UA Negative Negative    pH, Urine 6.5 5.0 - 8.0    Protein, POC Negative Negative mg/dL    Urobilinogen, UA Normal Normal, 0.2 E.U./dL    Nitrite, UA Negative Negative    Leukocytes Large (3+) (A) Negative     Assessment and Plan    Diagnoses and all orders for this visit:    1. Recurrent UTI (Primary)  -     POC Urinalysis Dipstick, Multipro  -     Urine Culture - Urine, Urine, Random Void; Future  -     Urine Culture - Urine, Urine, Random Void    77-year-old female established patient in with acute urinary tract infection symptoms consisted mainly of increased urinary frequency.     Remains on nitrofurantoin 50 mg daily as well as cranberry tablet due to recurrent UTI history    Noted increased urine frequency approximately 2 weeks ago    Urinalysis today large " leukocytes noted.  A few bacteria noted under microscope.     Reports not using Premarin cream as frequently as prescribed    We will go ahead and send patient's urine for culture today we will wait to treat patient until culture result is back.    Patient encouraged to start back on 3 times weekly vaginal Premarin use.    Continue daily over-the-counter cranberry tablet    Continue nightly nitrofurantoin prophylaxis for now.  Patient reports she has approximately 1 month remaining     We will have patient follow-up in 3 weeks with a repeat UA

## 2023-02-21 ENCOUNTER — OFFICE VISIT (OUTPATIENT)
Dept: UROLOGY | Facility: CLINIC | Age: 78
End: 2023-02-21
Payer: MEDICARE

## 2023-02-21 VITALS — TEMPERATURE: 97.7 F | WEIGHT: 160.2 LBS | HEIGHT: 66 IN | BODY MASS INDEX: 25.75 KG/M2

## 2023-02-21 DIAGNOSIS — N39.0 RECURRENT UTI: Primary | ICD-10-CM

## 2023-02-21 LAB
BILIRUB BLD-MCNC: NEGATIVE MG/DL
CLARITY, POC: CLEAR
COLOR UR: YELLOW
GLUCOSE UR STRIP-MCNC: NEGATIVE MG/DL
KETONES UR QL: NEGATIVE
LEUKOCYTE EST, POC: ABNORMAL
NITRITE UR-MCNC: NEGATIVE MG/ML
PH UR: 6.5 [PH] (ref 5–8)
PROT UR STRIP-MCNC: NEGATIVE MG/DL
RBC # UR STRIP: NEGATIVE /UL
SP GR UR: 1.02 (ref 1–1.03)
UROBILINOGEN UR QL: NORMAL

## 2023-02-21 PROCEDURE — 81001 URINALYSIS AUTO W/SCOPE: CPT

## 2023-02-21 PROCEDURE — 99214 OFFICE O/P EST MOD 30 MIN: CPT

## 2023-02-21 PROCEDURE — 87086 URINE CULTURE/COLONY COUNT: CPT

## 2023-02-22 LAB — BACTERIA SPEC AEROBE CULT: NORMAL

## 2023-02-24 ENCOUNTER — PROCEDURE VISIT (OUTPATIENT)
Dept: UROLOGY | Facility: CLINIC | Age: 78
End: 2023-02-24
Payer: MEDICARE

## 2023-02-24 DIAGNOSIS — N39.0 RECURRENT UTI: Primary | ICD-10-CM

## 2023-02-24 PROCEDURE — 87086 URINE CULTURE/COLONY COUNT: CPT | Performed by: PHYSICIAN ASSISTANT

## 2023-02-24 PROCEDURE — 81003 URINALYSIS AUTO W/O SCOPE: CPT

## 2023-02-24 NOTE — PROGRESS NOTES
Patient of JUSTIN Bingham is here today complaining of UTI. The patient denies any Fever. Clean catch urine obtained. UA Auto Dip ran and urine sent for C&S.    JUSTIN Bingham was in the office at the time of procedure. Patient was advised that we will call with results. Patient verbalized understanding. Mary SLIZBET    I have reviewed and agree with medical assistance documentation above

## 2023-02-25 LAB — BACTERIA SPEC AEROBE CULT: NORMAL

## 2023-02-27 ENCOUNTER — TELEPHONE (OUTPATIENT)
Dept: UROLOGY | Facility: CLINIC | Age: 78
End: 2023-02-27
Payer: MEDICARE

## 2023-02-27 DIAGNOSIS — N30.00 ACUTE CYSTITIS WITHOUT HEMATURIA: Primary | ICD-10-CM

## 2023-02-27 RX ORDER — CEFDINIR 300 MG/1
300 CAPSULE ORAL 2 TIMES DAILY
Qty: 20 CAPSULE | Refills: 0 | Status: SHIPPED | OUTPATIENT
Start: 2023-02-27

## 2023-02-27 NOTE — TELEPHONE ENCOUNTER
Please let pt know urine culture showing a contaminated specimen. If symptoms still present recommend a recollect by straight cath. Pt can also just double up on the nitrofurantoin that she has at home ans see if that improves things. Take 2 tab moring and night x5 days

## 2023-02-27 NOTE — TELEPHONE ENCOUNTER
I just called and spoke with patient and have gone ahead and sent her in a different antibiotic [facility]  thank you

## 2023-02-27 NOTE — TELEPHONE ENCOUNTER
Pt called and wanted to know results. I told her what Nickie replied to me. She isn't happy with this. She has already been doing what Nickie said to do because Nickie told her that in the office. (2 tabs am and pm X 5 days. She would like to speak to you.

## 2023-03-10 ENCOUNTER — OFFICE VISIT (OUTPATIENT)
Dept: UROLOGY | Facility: CLINIC | Age: 78
End: 2023-03-10
Payer: MEDICARE

## 2023-03-10 VITALS — WEIGHT: 160 LBS | TEMPERATURE: 98 F | BODY MASS INDEX: 25.71 KG/M2 | HEIGHT: 66 IN

## 2023-03-10 DIAGNOSIS — R39.15 URINARY URGENCY: Primary | ICD-10-CM

## 2023-03-10 PROCEDURE — 81001 URINALYSIS AUTO W/SCOPE: CPT

## 2023-03-10 PROCEDURE — 87086 URINE CULTURE/COLONY COUNT: CPT

## 2023-03-10 PROCEDURE — 99214 OFFICE O/P EST MOD 30 MIN: CPT

## 2023-03-10 PROCEDURE — 1159F MED LIST DOCD IN RCRD: CPT

## 2023-03-10 PROCEDURE — 1160F RVW MEDS BY RX/DR IN RCRD: CPT

## 2023-03-10 NOTE — PROGRESS NOTES
"Subjective    Ms. Pyle is 77 y.o. female    Chief Complaint: Urine urgency    History of Present Illness    77-year-old female established patient presents as a walk-in today in the clinic stating \"I need to be seen I think I have another urinary tract infection I am having a lot of urgency and frequency of urination\".  Patient has undergone multiple different urine cultures recently all of which showing no growth or contaminated specimens with mixed zev however patient continues to report increased urgency.  Patient currently taking nitrofurantoin 50 mg daily prophylaxis for history of suppose did recurrent UTIs.  As well as daily cranberry tablets and is using vaginal Premarin cream 2-3 times weekly.    The following portions of the patient's history were reviewed and updated as appropriate: allergies, current medications, past family history, past medical history, past social history, past surgical history and problem list.    Review of Systems   Constitutional: Negative for chills and fever.   Gastrointestinal: Negative for abdominal pain, anal bleeding, blood in stool, nausea and vomiting.   Genitourinary: Positive for frequency and urgency. Negative for decreased urine volume, difficulty urinating, dysuria, flank pain, hematuria, pelvic pain and vaginal pain.         Current Outpatient Medications:   •  amiodarone (PACERONE) 200 MG tablet, Take 100 mg by mouth Daily., Disp: , Rfl:   •  BIOTIN 5000 PO, Take  by mouth., Disp: , Rfl:   •  Calcium Carbonate-Vitamin D 600-200 MG-UNIT tablet, Calcium 600 with Vitamin D3 600 mg (1,500 mg)-200 unit tablet  Take 1 tablet every day by oral route., Disp: , Rfl:   •  cefdinir (OMNICEF) 300 MG capsule, Take 1 capsule by mouth 2 (Two) Times a Day., Disp: 20 capsule, Rfl: 0  •  Cholecalciferol (VITAMIN D3) 50 MCG (2000 UT) capsule, Take 1 capsule by mouth Daily., Disp: , Rfl:   •  conjugated estrogens (Premarin) 0.625 MG/GM vaginal cream, Insert 0,5gm Monday, Wednesday " and Friday, Disp: 1 each, Rfl: 11  •  Cranberry 1000 MG capsule, Take  by mouth., Disp: , Rfl:   •  dicyclomine (BENTYL) 10 MG capsule, Take 1 capsule by mouth As Needed., Disp: , Rfl:   •  dilTIAZem (CARDIZEM) 30 MG tablet, Take 1 tablet by mouth As Needed (HR sustained over 150 bpm)., Disp: 90 tablet, Rfl: 4  •  lisinopril (PRINIVIL,ZESTRIL) 20 MG tablet, Take 1 tablet by mouth Daily., Disp: , Rfl:   •  Multiple Vitamins-Minerals (PRESERVISION AREDS 2 PO), Take 1 tablet by mouth Daily., Disp: , Rfl:   •  Multiple Vitamins-Minerals (ZINC PO), Take  by mouth., Disp: , Rfl:   •  nitrofurantoin (MACRODANTIN) 50 MG capsule, Take 1 capsule by mouth Every Night., Disp: 90 capsule, Rfl: 0  •  Omega-3 Fatty Acids (Fish Oil Double Strength) 1200 MG capsule, Take 3,600 mg by mouth 2 (two) times a day., Disp: , Rfl:   •  Potassium 99 MG tablet, Take  by mouth., Disp: , Rfl:   •  Premarin 0.625 MG/GM vaginal cream, Insert 0.5 g into the vagina Daily., Disp: 0.05 g, Rfl: 11  •  rivaroxaban (XARELTO) 20 MG tablet, Take 1 tablet by mouth Daily., Disp: , Rfl:   •  sotalol (BETAPACE) 80 MG tablet, Take 1 tablet by mouth 2 (Two) Times a Day., Disp: , Rfl:   •  vitamin B-12 (CYANOCOBALAMIN) 1000 MCG tablet, Take 5 tablets by mouth Daily., Disp: , Rfl:   •  vitamin E 400 UNIT capsule, Take 1 capsule by mouth Daily., Disp: , Rfl:   •  Zinc 50 MG tablet, Take 1 tablet by mouth Daily., Disp: , Rfl:     Past Medical History:   Diagnosis Date   • A-fib (HCC)    • Hypertension    • Urinary tract infection        Past Surgical History:   Procedure Laterality Date   • APPENDECTOMY     • BLADDER SURGERY     • EYE SURGERY     • FOOT FRACTURE SURGERY     • HYSTERECTOMY         Social History     Socioeconomic History   • Marital status:    Tobacco Use   • Smoking status: Never   • Smokeless tobacco: Never   Vaping Use   • Vaping Use: Never used   Substance and Sexual Activity   • Alcohol use: No   • Drug use: No   • Sexual activity: Defer  "      Family History   Problem Relation Age of Onset   • Stroke Sister    • Hypertension Sister    • Stroke Mother    • Hypertension Mother    • Stroke Father    • Hypertension Father    • Arrhythmia Brother        Objective    Temp 98 °F (36.7 °C)   Ht 167.6 cm (66\")   Wt 72.6 kg (160 lb)   BMI 25.82 kg/m²     Physical Exam  Nursing note reviewed.   Constitutional:       General: She is not in acute distress.     Appearance: Normal appearance. She is not ill-appearing.   HENT:      Nose: No congestion.   Abdominal:      Tenderness: There is no right CVA tenderness or left CVA tenderness.      Hernia: No hernia is present.   Skin:     General: Skin is warm and dry.   Neurological:      Mental Status: She is alert and oriented to person, place, and time.   Psychiatric:         Mood and Affect: Mood normal.         Behavior: Behavior normal.             Results for orders placed or performed in visit on 03/10/23   POC Urinalysis Dipstick, Multipro    Specimen: Urine   Result Value Ref Range    Color Yellow Yellow, Straw, Dark Yellow, Opal    Clarity, UA Clear Clear    Glucose, UA Negative Negative mg/dL    Bilirubin Negative Negative    Ketones, UA Negative Negative    Specific Gravity  1.025 1.005 - 1.030    Blood, UA Negative Negative    pH, Urine 7.0 5.0 - 8.0    Protein, POC Negative Negative mg/dL    Urobilinogen, UA 0.2 E.U./dL Normal, 0.2 E.U./dL    Nitrite, UA Negative Negative    Leukocytes Negative Negative     Assessment and Plan    Diagnoses and all orders for this visit:    1. Urinary urgency (Primary)  -     POC Urinalysis Dipstick, Multipro  -     Urine Culture - Urine, Urine, Catheter In/Out; Future  -     Urine Culture - Urine, Urine, Catheter In/Out  -     Discontinue: Mirabegron ER (Myrbetriq) 25 MG tablet sustained-release 24 hour 24 hr tablet; Take 1 tablet by mouth Daily.  Dispense: 30 tablet; Refill: 11      77-year-old female established patient presents as a walk-in today in the clinic " "stating \"I need to be seen I think I have another urinary tract infection I am having a lot of urgency and frequency of urination\".     Completed full 10-day course of cefdinir and symptoms immediately returned    Urinalysis today is clear-had medical assistant Sheron collect through straight cath to send for culture as well    Multiple urine cultures over the last few months-no growth or mixed zev no confirmed infections despite ongoing symptoms    Denies any dysuria, suprapubic pain or pressure, fever or chills    Only reports urine urgency and increased frequency at times, denies any incontinence    Will try patient on an overactive bladder medication Myrbetriq 25 mg daily, will provide patient with a 2-month sample pack    If no improvement in symptoms with OAB med may consider referral to OB/GYN for further evaluation regarding possible treatment for IC type symptoms with [place]    We will have patient follow-up in 2 months for reevaluation    "

## 2023-03-10 NOTE — PROGRESS NOTES
Patient is here today complaining of urgency. I/O catheter urine obtained. UA Auto Dip ran and urine sent for C&S.   JUSTIN Bingham was in the office at the time of procedure. Patient was advised that we will call with results. Patient verbalized understanding.     I have reviewed and agree with medical assistance documentation above

## 2023-03-12 LAB — BACTERIA SPEC AEROBE CULT: NO GROWTH

## 2023-03-20 ENCOUNTER — TELEPHONE (OUTPATIENT)
Dept: UROLOGY | Facility: CLINIC | Age: 78
End: 2023-03-20
Payer: MEDICARE

## 2023-03-20 NOTE — TELEPHONE ENCOUNTER
Called patient back- I informed her to call her pharmacy about the cost and picking it up through them.   We did supply patient a 2 month sample pack- I informed her that when she is done with those, to give us a call and I will get it sent through to [facility].  Patient v/u

## 2023-03-20 NOTE — TELEPHONE ENCOUNTER
Patient called the office, and said that the myrbetriq was called into the wrong pharmacy. But the issue is, her  has already picked up the medication from the pharmacy.      It should have went to [facility] instead of [facility].     Patient would like a call back.   [phone]

## 2023-04-10 DIAGNOSIS — N32.81 OAB (OVERACTIVE BLADDER): Primary | ICD-10-CM

## 2023-04-10 NOTE — TELEPHONE ENCOUNTER
Pt called and stated that her medication is working well and see is pleased [facility] told her that it would take two weeks for the medication to get to home so she would like to be order today so that she doesn't have a period without the medication.

## 2023-04-13 DIAGNOSIS — N32.81 OAB (OVERACTIVE BLADDER): ICD-10-CM

## 2023-05-09 NOTE — PROGRESS NOTES
"Subjective    Ms. Pyle is 77 y.o. female    Chief Complaint: OAB    History of Present Illness     77-year-old female established patient in for 2-month follow-up after starting Myrbetriq 25 mg daily due to symptoms of urinary frequency and urgency.  Patient states \"you figured it out, all my symptoms have resolved\".  Patient had previously been treated with multiple different rounds of antibiotics for suspected infections however cultures always showed no growth or contaminated specimens had been placed on  prophylactic antibiotic treatments however as soon as antibiotic completed patient symptoms returned.    Patient does remain on 3 times weekly vaginal estrogen cream Premarin noting improvement.    The following portions of the patient's history were reviewed and updated as appropriate: allergies, current medications, past family history, past medical history, past social history, past surgical history and problem list.    Review of Systems   Constitutional: Negative for chills, fatigue and fever.   Gastrointestinal: Negative for nausea and vomiting.   Genitourinary: Negative for decreased urine volume, difficulty urinating, dysuria, flank pain, frequency, hematuria, pelvic pain, urgency and vaginal pain.         Current Outpatient Medications:   •  amiodarone (PACERONE) 200 MG tablet, Take 100 mg by mouth Daily., Disp: , Rfl:   •  BIOTIN 5000 PO, Take  by mouth., Disp: , Rfl:   •  Calcium Carbonate-Vitamin D 600-200 MG-UNIT tablet, Calcium 600 with Vitamin D3 600 mg (1,500 mg)-200 unit tablet  Take 1 tablet every day by oral route., Disp: , Rfl:   •  Cholecalciferol (VITAMIN D3) 50 MCG (2000 UT) capsule, Take 1 capsule by mouth Daily., Disp: , Rfl:   •  conjugated estrogens (Premarin) 0.625 MG/GM vaginal cream, Insert 0,5gm Monday, Wednesday and Friday, Disp: 1 each, Rfl: 11  •  Cranberry 1000 MG capsule, Take  by mouth., Disp: , Rfl:   •  dicyclomine (BENTYL) 10 MG capsule, Take 1 capsule by mouth As " "Needed., Disp: , Rfl:   •  dilTIAZem (CARDIZEM) 30 MG tablet, Take 1 tablet by mouth As Needed (HR sustained over 150 bpm)., Disp: 90 tablet, Rfl: 4  •  lisinopril (PRINIVIL,ZESTRIL) 20 MG tablet, Take 1 tablet by mouth Daily., Disp: , Rfl:   •  Mirabegron ER (Myrbetriq) 25 MG tablet sustained-release 24 hour 24 hr tablet, Take 1 tablet by mouth Daily., Disp: 90 tablet, Rfl: 11  •  Multiple Vitamins-Minerals (PRESERVISION AREDS 2 PO), Take 1 tablet by mouth Daily., Disp: , Rfl:   •  Multiple Vitamins-Minerals (ZINC PO), Take  by mouth., Disp: , Rfl:   •  Omega-3 Fatty Acids (Fish Oil Double Strength) 1200 MG capsule, Take 3,600 mg by mouth 2 (two) times a day., Disp: , Rfl:   •  Potassium 99 MG tablet, Take  by mouth., Disp: , Rfl:   •  rivaroxaban (XARELTO) 20 MG tablet, Take 1 tablet by mouth Daily., Disp: , Rfl:   •  vitamin B-12 (CYANOCOBALAMIN) 1000 MCG tablet, Take 5 tablets by mouth Daily., Disp: , Rfl:   •  vitamin E 400 UNIT capsule, Take 1 capsule by mouth Daily., Disp: , Rfl:   •  Zinc 50 MG tablet, Take 1 tablet by mouth Daily., Disp: , Rfl:     Past Medical History:   Diagnosis Date   • A-fib    • Hypertension    • Urinary tract infection        Past Surgical History:   Procedure Laterality Date   • APPENDECTOMY     • BLADDER SURGERY     • EYE SURGERY     • FOOT FRACTURE SURGERY     • HYSTERECTOMY         Social History     Socioeconomic History   • Marital status:    Tobacco Use   • Smoking status: Never   • Smokeless tobacco: Never   Vaping Use   • Vaping Use: Never used   Substance and Sexual Activity   • Alcohol use: No   • Drug use: No   • Sexual activity: Defer       Family History   Problem Relation Age of Onset   • Stroke Sister    • Hypertension Sister    • Stroke Mother    • Hypertension Mother    • Stroke Father    • Hypertension Father    • Arrhythmia Brother        Objective    Temp 97.4 °F (36.3 °C)   Ht 167.6 cm (66\")   Wt 72.4 kg (159 lb 9.6 oz)   BMI 25.76 kg/m²     Physical " Exam  Nursing note reviewed.   Constitutional:       General: She is not in acute distress.     Appearance: Normal appearance. She is not ill-appearing.   HENT:      Nose: No congestion.   Abdominal:      Tenderness: There is no right CVA tenderness or left CVA tenderness.      Hernia: No hernia is present.   Skin:     General: Skin is warm and dry.   Neurological:      Mental Status: She is alert and oriented to person, place, and time.   Psychiatric:         Mood and Affect: Mood normal.         Behavior: Behavior normal.             Results for orders placed or performed in visit on 05/12/23   POC Urinalysis Dipstick, Multipro    Specimen: Urine   Result Value Ref Range    Color Yellow Yellow, Straw, Dark Yellow, Opal    Clarity, UA Clear Clear    Glucose, UA Negative Negative mg/dL    Bilirubin Negative Negative    Ketones, UA Negative Negative    Specific Gravity  1.020 1.005 - 1.030    Blood, UA Small (A) Negative    pH, Urine 5.0 5.0 - 8.0    Protein, POC Negative Negative mg/dL    Urobilinogen, UA 0.2 E.U./dL Normal, 0.2 E.U./dL    Nitrite, UA Positive (A) Negative    Leukocytes Trace (A) Negative   Estimation of residual urine via abdominal ultrasound  Residual Urine: 0 ml  Indication: OAB  Position: Supine  Examination: Incremental scanning of the suprapubic area using 3 MHz transducer using copious amounts of acoustic gel.   Findings: An anechoic area was demonstrated which represented the bladder, with measurement of residual urine as noted. I inspected this myself. In that the residual urine was stable or insignificant, no treatment will be necessary at this time.       Assessment and Plan    Diagnoses and all orders for this visit:    1. OAB (overactive bladder) (Primary)  -     POC Urinalysis Dipstick, Multipro    2. Urinary urgency  -     POC Urinalysis Dipstick, Multipro         77-year-old female established patient in for 2-month follow-up after starting Myrbetriq 25 mg daily due to symptoms of  "urinary frequency and urgency.  Patient states \"you figured it out, all my symptoms have resolved\".    Patient doing well    Urinalysis today showing positive for trace blood, nitrites and leukocytes  However patient completely asymptomatic-no treatment warranted    We will continue Myrbetriq 25 mg daily as well as vaginal estrogen cream 3 times weekly Premarin    Follow-up in 6 months for reevaluation      "

## 2023-05-12 ENCOUNTER — OFFICE VISIT (OUTPATIENT)
Dept: UROLOGY | Facility: CLINIC | Age: 78
End: 2023-05-12
Payer: MEDICARE

## 2023-05-12 VITALS — WEIGHT: 159.6 LBS | HEIGHT: 66 IN | BODY MASS INDEX: 25.65 KG/M2 | TEMPERATURE: 97.4 F

## 2023-05-12 DIAGNOSIS — N32.81 OAB (OVERACTIVE BLADDER): Primary | ICD-10-CM

## 2023-05-12 DIAGNOSIS — R39.15 URINARY URGENCY: ICD-10-CM

## 2023-05-12 LAB
BILIRUB BLD-MCNC: NEGATIVE MG/DL
CLARITY, POC: CLEAR
COLOR UR: YELLOW
GLUCOSE UR STRIP-MCNC: NEGATIVE MG/DL
KETONES UR QL: NEGATIVE
LEUKOCYTE EST, POC: ABNORMAL
NITRITE UR-MCNC: POSITIVE MG/ML
PH UR: 5 [PH] (ref 5–8)
PROT UR STRIP-MCNC: NEGATIVE MG/DL
RBC # UR STRIP: ABNORMAL /UL
SP GR UR: 1.02 (ref 1–1.03)
UROBILINOGEN UR QL: ABNORMAL

## 2023-05-30 DIAGNOSIS — R39.15 URINARY URGENCY: Primary | ICD-10-CM

## 2023-06-27 PROBLEM — I48.91 A-FIB: Chronic | Status: ACTIVE | Noted: 2019-04-25

## 2023-06-27 PROBLEM — Z79.01 CURRENT USE OF LONG TERM ANTICOAGULATION: Status: ACTIVE | Noted: 2023-06-27

## 2023-06-27 PROBLEM — I10 HYPERTENSION: Chronic | Status: ACTIVE | Noted: 2019-04-25

## 2023-09-05 ENCOUNTER — TELEPHONE (OUTPATIENT)
Dept: CARDIOLOGY | Facility: CLINIC | Age: 78
End: 2023-09-05
Payer: MEDICARE

## 2023-09-05 NOTE — TELEPHONE ENCOUNTER
Patient fell and hit her head and has a hematoma. She was in the hospital in [place] seeing Neurology and the neurologist took her off of Amiodarone, diltiazim, lisinopril and Xarelto. Besides the Xarelto should she start taking these medications again now or should she wait like Neuro wants her too. She has a CT scheduled for next week in [place]. Her blood pressure has been good when home care has taken it. Please advise.

## 2023-09-06 NOTE — TELEPHONE ENCOUNTER
Called and let Ashlyn at [facility] know what Dr Ahmadi recommended. She verbalized understanding.

## 2023-09-18 ENCOUNTER — TELEPHONE (OUTPATIENT)
Dept: CARDIOLOGY | Facility: CLINIC | Age: 78
End: 2023-09-18
Payer: MEDICARE

## 2023-09-18 ENCOUNTER — PREP FOR SURGERY (OUTPATIENT)
Dept: OTHER | Facility: HOSPITAL | Age: 78
End: 2023-09-18
Payer: MEDICARE

## 2023-09-18 DIAGNOSIS — I48.0 PAF (PAROXYSMAL ATRIAL FIBRILLATION): Primary | ICD-10-CM

## 2023-09-18 RX ORDER — SODIUM CHLORIDE 9 MG/ML
20 INJECTION, SOLUTION INTRAVENOUS CONTINUOUS
OUTPATIENT
Start: 2023-09-18

## 2023-09-18 RX ORDER — SODIUM CHLORIDE 0.9 % (FLUSH) 0.9 %
10 SYRINGE (ML) INJECTION AS NEEDED
OUTPATIENT
Start: 2023-09-18

## 2023-09-18 RX ORDER — SODIUM CHLORIDE 0.9 % (FLUSH) 0.9 %
10 SYRINGE (ML) INJECTION EVERY 12 HOURS SCHEDULED
OUTPATIENT
Start: 2023-09-18

## 2023-09-18 NOTE — TELEPHONE ENCOUNTER
Caller: Vane Pyle    Relationship to patient: Self    Best call back number: [phone] FIRST  OR CALL HOME     Chief complaint: POSSIBLE WATCHMAN     Type of visit: WATCHMAN     Requested date: WHEN APPLICABLE --ASAP          Additional notes:PATIENT SEEN NEURO TODAY 09.18.23 AND PATIENT HAS STOPPED XERELTO BUT NEURO STATES NO LONGER BLEEDING SO OKAY TO START. THE WATCHMAN IS REQUESTED TO BE DONE ASAP IN ORDER TO STOP XERELTO LONG TERM. PLEASE CALL WITH AN APPT TO DISCUSS THE WATCHMAN PROCEDURE OR WITH THE ACTUAL PROCEDURE APPT.  [facility] WAS MENTIONED TO POSSIBLE SCHEDULE THIS.

## 2023-09-18 NOTE — TELEPHONE ENCOUNTER
Call to pt to discuss Watchman work up. She is familiar with the process and is agreeable to proceed with CASS. She reports that she has jonathan cleared to restart her Xarelto, she was advised to go ahead and restart that.    Dr. Ahmadi made aware of conversation. He wants the pt to resume her Xarelto and we will get her scheduled for a pre-screening CASS.

## 2023-09-19 NOTE — PROGRESS NOTES
Dr. El please advise ok to work in?  Will not see any other provider   Subjective    Ms. Pyle is 78 y.o. female    Chief Complaint: follow up hospital encounter for UTI    History of Present Illness    78-year-old female established patient in for follow-up regarding recent hospital encounter due to a fall with loss of consciousness with brain bleed where patient was found to have a urinary tract infection likely contributing to the fall.  Was treated with IV antibiotic therapy and later transition to cefdinir x10 days fall occurred August 28, 2023 was last flighted to [facility] in [place].  Patient is concerned at this time about remaining on Myrbetriq as she feels as though it mask the UTI symptoms.       remains on 3 times weekly vaginal estrogen cream Premarin noting improvement.    The following portions of the patient's history were reviewed and updated as appropriate: allergies, current medications, past family history, past medical history, past social history, past surgical history and problem list.    Review of Systems   Constitutional:  Negative for chills, fatigue and fever.   Gastrointestinal:  Negative for nausea and vomiting.   Genitourinary:  Positive for frequency and urgency. Negative for decreased urine volume, difficulty urinating, dysuria, flank pain, hematuria, pelvic pain and vaginal pain.   Neurological:  Positive for weakness and headaches.       Current Outpatient Medications:     amiodarone (PACERONE) 200 MG tablet, Take 1 tablet by mouth Daily., Disp: , Rfl:     BIOTIN 5000 PO, Take  by mouth., Disp: , Rfl:     Calcium Carbonate-Vitamin D 600-200 MG-UNIT tablet, Calcium 600 with Vitamin D3 600 mg (1,500 mg)-200 unit tablet  Take 1 tablet every day by oral route., Disp: , Rfl:     Cholecalciferol (VITAMIN D3) 50 MCG (2000 UT) capsule, Take 1 capsule by mouth Daily., Disp: , Rfl:     conjugated estrogens (Premarin) 0.625 MG/GM vaginal cream, Insert 0,5gm Monday, Wednesday and Friday, Disp: 1 each, Rfl: 11    Cranberry 1000 MG capsule, Take  by  mouth., Disp: , Rfl:     dicyclomine (BENTYL) 10 MG capsule, Take 1 capsule by mouth As Needed., Disp: , Rfl:     dilTIAZem (CARDIZEM) 30 MG tablet, Take 1 tablet by mouth As Needed (HR sustained over 150 bpm)., Disp: 90 tablet, Rfl: 4    ferrous sulfate 324 (65 Fe) MG tablet delayed-release EC tablet, Take 1 tablet by mouth Daily With Breakfast., Disp: , Rfl:     Glucosamine 500 MG capsule, Take  by mouth., Disp: , Rfl:     lisinopril (PRINIVIL,ZESTRIL) 20 MG tablet, Take 1 tablet by mouth Daily., Disp: , Rfl:     Magnesium Gluconate (MAGNESIUM 27 PO), Take  by mouth., Disp: , Rfl:     Multiple Vitamins-Minerals (PRESERVISION AREDS 2 PO), Take 1 tablet by mouth Daily., Disp: , Rfl:     Multiple Vitamins-Minerals (ZINC PO), Take  by mouth., Disp: , Rfl:     Omega-3 Fatty Acids (Fish Oil Double Strength) 1200 MG capsule, Take 3,600 mg by mouth 2 (two) times a day., Disp: , Rfl:     Potassium 99 MG tablet, Take  by mouth., Disp: , Rfl:     rivaroxaban (XARELTO) 20 MG tablet, Take 1 tablet by mouth Daily., Disp: , Rfl:     vitamin B-12 (CYANOCOBALAMIN) 1000 MCG tablet, Take 5 tablets by mouth Daily., Disp: , Rfl:     vitamin E 400 UNIT capsule, Take 1 capsule by mouth Daily., Disp: , Rfl:     Zinc 50 MG tablet, Take 1 tablet by mouth Daily., Disp: , Rfl:     Mirabegron ER (MYRBETRIQ) 50 MG tablet sustained-release 24 hour 24 hr tablet, Take 50 mg by mouth Daily. (Patient not taking: Reported on 9/21/2023), Disp: 90 tablet, Rfl: 3    Past Medical History:   Diagnosis Date    A-fib     Hypertension     Urinary tract infection        Past Surgical History:   Procedure Laterality Date    APPENDECTOMY      BLADDER SURGERY      EYE SURGERY      FOOT FRACTURE SURGERY      HYSTERECTOMY         Social History     Socioeconomic History    Marital status:    Tobacco Use    Smoking status: Never    Smokeless tobacco: Never   Vaping Use    Vaping Use: Never used   Substance and Sexual Activity    Alcohol use: No    Drug use:  "No    Sexual activity: Defer       Family History   Problem Relation Age of Onset    Stroke Sister     Hypertension Sister     Stroke Mother     Hypertension Mother     Stroke Father     Hypertension Father     Arrhythmia Brother        Objective    Temp 97.4 °F (36.3 °C)   Ht 167.6 cm (65.98\")   Wt 70 kg (154 lb 6.4 oz)   BMI 24.93 kg/m²     Physical Exam  Nursing note reviewed.   Constitutional:       General: She is not in acute distress.     Appearance: Normal appearance. She is not ill-appearing.   HENT:      Nose: No congestion.   Abdominal:      Tenderness: There is no right CVA tenderness or left CVA tenderness.      Hernia: No hernia is present.   Skin:     General: Skin is warm and dry.   Neurological:      Mental Status: She is alert and oriented to person, place, and time.   Psychiatric:         Mood and Affect: Mood normal.         Behavior: Behavior normal.           Results for orders placed or performed in visit on 09/21/23   POC Urinalysis Dipstick, Multipro    Specimen: Urine   Result Value Ref Range    Color Yellow Yellow, Straw, Dark Yellow, Opal    Clarity, UA Clear Clear    Glucose, UA Negative Negative mg/dL    Bilirubin Negative Negative    Ketones, UA Negative Negative    Specific Gravity  1.020 1.005 - 1.030    Blood, UA Negative Negative    pH, Urine 6.0 5.0 - 8.0    Protein, POC Negative Negative mg/dL    Urobilinogen, UA 0.2 E.U./dL Normal, 0.2 E.U./dL    Nitrite, UA Positive (A) Negative    Leukocytes Small (1+) (A) Negative   Estimation of residual urine via abdominal ultrasound  Residual Urine: 0 ml  Indication: oab  Position: Supine  Examination: Incremental scanning of the suprapubic area using 3 MHz transducer using copious amounts of acoustic gel.   Findings: An anechoic area was demonstrated which represented the bladder, with measurement of residual urine as noted. I inspected this myself. In that the residual urine was stable or insignificant, no treatment will be necessary " at this time.      Assessment and Plan    Diagnoses and all orders for this visit:    1. Urinary urgency (Primary)  -     POC Urinalysis Dipstick, Multipro    2. OAB (overactive bladder)  -     POC Urinalysis Dipstick, Multipro    3. Acute cystitis without hematuria  -     Urine Culture - Urine, Urine, Random Void      78-year-old female established patient in for follow-up regarding recent hospital encounter due to a fall with loss of consciousness with brain bleed where patient was found to have a urinary tract infection likely contributing to the fall.  Was treated with IV antibiotic therapy and later transition to cefdinir x10 days fall occurred August 28, 2023 was last flighted to [facility] in [place].  Patient is concerned at this time about remaining on Myrbetriq as she feels as though it mask the UTI symptoms.      Urinalysis at present appears infected.  Bacteria is visualized under the microscope.  We will go ahead and send urine off for culture today.  Patient states was prescribed an antibiotic through Dr. Pedraza's office this past Friday however never started taking as patient was told by her home health nurse that the Xarelto would not be effective with the antibiotic usage therefore never started the medication.  I have instructed patient to go ahead and start the previously prescribed nitrofurantoin and will contact patient once culture result is back if need to change antibiotic.    In regards to the overactive bladder medication Myrbetriq I feel it is safe for patient to start back on the Myrbetriq as patient despite multiple different treatments for suspected UTIs in the past continued to have ongoing urine frequency and urgency with no actual bacterial growth leading to the diagnosis of overactive bladder.    Patient to continue Myrbetriq 25 mg daily as well as vaginal estrogen cream 2-3 times weekly    We will have patient return in 1 month for repeat UA

## 2023-09-21 ENCOUNTER — OFFICE VISIT (OUTPATIENT)
Dept: UROLOGY | Facility: CLINIC | Age: 78
End: 2023-09-21
Payer: MEDICARE

## 2023-09-21 VITALS — WEIGHT: 154.4 LBS | BODY MASS INDEX: 24.81 KG/M2 | HEIGHT: 66 IN | TEMPERATURE: 97.4 F

## 2023-09-21 DIAGNOSIS — R39.15 URINARY URGENCY: Primary | ICD-10-CM

## 2023-09-21 DIAGNOSIS — N30.00 ACUTE CYSTITIS WITHOUT HEMATURIA: ICD-10-CM

## 2023-09-21 DIAGNOSIS — N32.81 OAB (OVERACTIVE BLADDER): ICD-10-CM

## 2023-09-21 LAB
BILIRUB BLD-MCNC: NEGATIVE MG/DL
CLARITY, POC: CLEAR
COLOR UR: YELLOW
GLUCOSE UR STRIP-MCNC: NEGATIVE MG/DL
KETONES UR QL: NEGATIVE
LEUKOCYTE EST, POC: ABNORMAL
NITRITE UR-MCNC: POSITIVE MG/ML
PH UR: 6 [PH] (ref 5–8)
PROT UR STRIP-MCNC: NEGATIVE MG/DL
RBC # UR STRIP: NEGATIVE /UL
SP GR UR: 1.02 (ref 1–1.03)
UROBILINOGEN UR QL: ABNORMAL

## 2023-09-21 PROCEDURE — 87186 SC STD MICRODIL/AGAR DIL: CPT

## 2023-09-21 PROCEDURE — 87077 CULTURE AEROBIC IDENTIFY: CPT

## 2023-09-21 PROCEDURE — 87086 URINE CULTURE/COLONY COUNT: CPT

## 2023-09-23 LAB — BACTERIA SPEC AEROBE CULT: ABNORMAL

## 2023-09-25 NOTE — PROGRESS NOTES
Based on urine culture pt will need a week of IV abx therapy. Please find out which infusion center is closest to pt and I will get set up

## 2023-09-27 ENCOUNTER — TELEPHONE (OUTPATIENT)
Dept: UROLOGY | Facility: CLINIC | Age: 78
End: 2023-09-27
Payer: MEDICARE

## 2023-09-27 PROBLEM — N39.0 UTI (URINARY TRACT INFECTION): Status: ACTIVE | Noted: 2023-09-27

## 2023-09-27 RX ORDER — SODIUM CHLORIDE 9 MG/ML
250 INJECTION, SOLUTION INTRAVENOUS ONCE
Status: CANCELLED | OUTPATIENT
Start: 2023-09-28

## 2023-09-27 NOTE — TELEPHONE ENCOUNTER
Based on urine culture pt will need a week of IV abx therapy.  I tried to get her scheduled closer to home but was unsuccessful. So, she has agreed to come to our cancer center to have the IV antibiotic done. Have her scheduled starting tomorrow 9/28/23. I did inform the patient that she would have to be there by 7AM on Saturday and Sunday and check in through the ER.    Patient verbalized understanding.

## 2023-09-27 NOTE — TELEPHONE ENCOUNTER
----- Message from JUSTIN Byrd sent at 9/25/2023  8:09 AM CDT -----  Based on urine culture pt will need a week of IV abx therapy. Please find out which infusion center is closest to pt and I will get set up

## 2023-09-28 ENCOUNTER — INFUSION (OUTPATIENT)
Dept: ONCOLOGY | Facility: HOSPITAL | Age: 78
End: 2023-09-28
Payer: MEDICARE

## 2023-09-28 VITALS
RESPIRATION RATE: 16 BRPM | DIASTOLIC BLOOD PRESSURE: 61 MMHG | HEART RATE: 52 BPM | OXYGEN SATURATION: 100 % | TEMPERATURE: 97.4 F | HEIGHT: 66 IN | WEIGHT: 153.6 LBS | BODY MASS INDEX: 24.68 KG/M2 | SYSTOLIC BLOOD PRESSURE: 142 MMHG

## 2023-09-28 DIAGNOSIS — N39.0 URINARY TRACT INFECTION WITHOUT HEMATURIA, SITE UNSPECIFIED: Primary | ICD-10-CM

## 2023-09-28 PROCEDURE — 96365 THER/PROPH/DIAG IV INF INIT: CPT

## 2023-09-28 PROCEDURE — 25010000002 ERTAPENEM PER 500 MG: Performed by: NURSE PRACTITIONER

## 2023-09-28 RX ORDER — SODIUM CHLORIDE 9 MG/ML
250 INJECTION, SOLUTION INTRAVENOUS ONCE
Status: COMPLETED | OUTPATIENT
Start: 2023-09-28 | End: 2023-09-28

## 2023-09-28 RX ORDER — SODIUM CHLORIDE 9 MG/ML
250 INJECTION, SOLUTION INTRAVENOUS ONCE
Status: CANCELLED | OUTPATIENT
Start: 2023-09-28

## 2023-09-28 RX ADMIN — ERTAPENEM SODIUM 1000 MG: 1 INJECTION, POWDER, LYOPHILIZED, FOR SOLUTION INTRAMUSCULAR; INTRAVENOUS at 10:09

## 2023-09-28 RX ADMIN — SODIUM CHLORIDE 250 ML: 9 INJECTION, SOLUTION INTRAVENOUS at 10:07

## 2023-09-29 ENCOUNTER — INFUSION (OUTPATIENT)
Dept: ONCOLOGY | Facility: HOSPITAL | Age: 78
End: 2023-09-29
Payer: MEDICARE

## 2023-09-29 VITALS
TEMPERATURE: 97.4 F | DIASTOLIC BLOOD PRESSURE: 54 MMHG | RESPIRATION RATE: 18 BRPM | HEART RATE: 62 BPM | BODY MASS INDEX: 24.68 KG/M2 | OXYGEN SATURATION: 100 % | SYSTOLIC BLOOD PRESSURE: 143 MMHG | HEIGHT: 66 IN | WEIGHT: 153.6 LBS

## 2023-09-29 DIAGNOSIS — N39.0 URINARY TRACT INFECTION WITHOUT HEMATURIA, SITE UNSPECIFIED: Primary | ICD-10-CM

## 2023-09-29 PROCEDURE — 96367 TX/PROPH/DG ADDL SEQ IV INF: CPT

## 2023-09-29 PROCEDURE — 25010000002 ERTAPENEM PER 500 MG: Performed by: NURSE PRACTITIONER

## 2023-09-29 PROCEDURE — 96365 THER/PROPH/DIAG IV INF INIT: CPT

## 2023-09-29 RX ORDER — SODIUM CHLORIDE 9 MG/ML
250 INJECTION, SOLUTION INTRAVENOUS ONCE
Status: COMPLETED | OUTPATIENT
Start: 2023-09-29 | End: 2023-09-29

## 2023-09-29 RX ORDER — SODIUM CHLORIDE 9 MG/ML
250 INJECTION, SOLUTION INTRAVENOUS ONCE
Status: CANCELLED | OUTPATIENT
Start: 2023-09-29

## 2023-09-29 RX ADMIN — ERTAPENEM SODIUM 1000 MG: 1 INJECTION, POWDER, LYOPHILIZED, FOR SOLUTION INTRAMUSCULAR; INTRAVENOUS at 09:03

## 2023-09-29 RX ADMIN — SODIUM CHLORIDE 250 ML: 9 INJECTION, SOLUTION INTRAVENOUS at 09:03

## 2023-09-30 ENCOUNTER — INFUSION (OUTPATIENT)
Dept: ONCOLOGY | Facility: HOSPITAL | Age: 78
End: 2023-09-30
Payer: MEDICARE

## 2023-09-30 VITALS
OXYGEN SATURATION: 99 % | TEMPERATURE: 97.4 F | HEART RATE: 53 BPM | RESPIRATION RATE: 14 BRPM | DIASTOLIC BLOOD PRESSURE: 72 MMHG | HEIGHT: 66 IN | SYSTOLIC BLOOD PRESSURE: 147 MMHG | BODY MASS INDEX: 24.79 KG/M2

## 2023-09-30 DIAGNOSIS — N39.0 URINARY TRACT INFECTION WITHOUT HEMATURIA, SITE UNSPECIFIED: Primary | ICD-10-CM

## 2023-09-30 PROCEDURE — 25010000002 ERTAPENEM PER 500 MG: Performed by: NURSE PRACTITIONER

## 2023-09-30 PROCEDURE — 96365 THER/PROPH/DIAG IV INF INIT: CPT

## 2023-09-30 RX ORDER — SODIUM CHLORIDE 9 MG/ML
250 INJECTION, SOLUTION INTRAVENOUS ONCE
Status: CANCELLED | OUTPATIENT
Start: 2023-09-30

## 2023-09-30 RX ORDER — SODIUM CHLORIDE 9 MG/ML
250 INJECTION, SOLUTION INTRAVENOUS ONCE
Status: COMPLETED | OUTPATIENT
Start: 2023-09-30 | End: 2023-09-30

## 2023-09-30 RX ADMIN — SODIUM CHLORIDE 250 ML: 9 INJECTION, SOLUTION INTRAVENOUS at 07:22

## 2023-09-30 RX ADMIN — ERTAPENEM SODIUM 1000 MG: 1 INJECTION, POWDER, LYOPHILIZED, FOR SOLUTION INTRAMUSCULAR; INTRAVENOUS at 07:23

## 2023-10-01 ENCOUNTER — INFUSION (OUTPATIENT)
Dept: ONCOLOGY | Facility: HOSPITAL | Age: 78
End: 2023-10-01
Payer: MEDICARE

## 2023-10-01 VITALS
OXYGEN SATURATION: 98 % | TEMPERATURE: 97.4 F | BODY MASS INDEX: 24.79 KG/M2 | RESPIRATION RATE: 14 BRPM | SYSTOLIC BLOOD PRESSURE: 147 MMHG | HEIGHT: 66 IN | DIASTOLIC BLOOD PRESSURE: 80 MMHG | HEART RATE: 59 BPM

## 2023-10-01 DIAGNOSIS — N39.0 URINARY TRACT INFECTION WITHOUT HEMATURIA, SITE UNSPECIFIED: Primary | ICD-10-CM

## 2023-10-01 PROCEDURE — 96365 THER/PROPH/DIAG IV INF INIT: CPT

## 2023-10-01 PROCEDURE — 25010000002 ERTAPENEM PER 500 MG: Performed by: NURSE PRACTITIONER

## 2023-10-01 RX ORDER — SODIUM CHLORIDE 9 MG/ML
250 INJECTION, SOLUTION INTRAVENOUS ONCE
Status: COMPLETED | OUTPATIENT
Start: 2023-10-01 | End: 2023-10-01

## 2023-10-01 RX ORDER — SODIUM CHLORIDE 9 MG/ML
250 INJECTION, SOLUTION INTRAVENOUS ONCE
Status: CANCELLED | OUTPATIENT
Start: 2023-10-01

## 2023-10-01 RX ADMIN — SODIUM CHLORIDE 250 ML: 9 INJECTION, SOLUTION INTRAVENOUS at 07:18

## 2023-10-01 RX ADMIN — ERTAPENEM SODIUM 1000 MG: 1 INJECTION, POWDER, LYOPHILIZED, FOR SOLUTION INTRAMUSCULAR; INTRAVENOUS at 07:18

## 2023-10-02 ENCOUNTER — INFUSION (OUTPATIENT)
Dept: ONCOLOGY | Facility: HOSPITAL | Age: 78
End: 2023-10-02
Payer: MEDICARE

## 2023-10-02 VITALS
OXYGEN SATURATION: 100 % | DIASTOLIC BLOOD PRESSURE: 66 MMHG | HEIGHT: 67 IN | RESPIRATION RATE: 16 BRPM | HEART RATE: 67 BPM | SYSTOLIC BLOOD PRESSURE: 151 MMHG | TEMPERATURE: 97.3 F | BODY MASS INDEX: 24.01 KG/M2 | WEIGHT: 153 LBS

## 2023-10-02 DIAGNOSIS — N39.0 URINARY TRACT INFECTION WITHOUT HEMATURIA, SITE UNSPECIFIED: Primary | ICD-10-CM

## 2023-10-02 PROCEDURE — 96365 THER/PROPH/DIAG IV INF INIT: CPT

## 2023-10-02 PROCEDURE — 25810000003 SODIUM CHLORIDE 0.9 % SOLUTION: Performed by: NURSE PRACTITIONER

## 2023-10-02 PROCEDURE — 25010000002 ERTAPENEM PER 500 MG: Performed by: NURSE PRACTITIONER

## 2023-10-02 RX ORDER — SODIUM CHLORIDE 9 MG/ML
250 INJECTION, SOLUTION INTRAVENOUS ONCE
Status: CANCELLED | OUTPATIENT
Start: 2023-10-02

## 2023-10-02 RX ORDER — SODIUM CHLORIDE 9 MG/ML
250 INJECTION, SOLUTION INTRAVENOUS ONCE
Status: COMPLETED | OUTPATIENT
Start: 2023-10-02 | End: 2023-10-02

## 2023-10-02 RX ADMIN — SODIUM CHLORIDE 250 ML: 9 INJECTION, SOLUTION INTRAVENOUS at 14:23

## 2023-10-02 RX ADMIN — ERTAPENEM 1000 MG: 1 INJECTION INTRAMUSCULAR; INTRAVENOUS at 14:23

## 2023-10-03 ENCOUNTER — INFUSION (OUTPATIENT)
Dept: ONCOLOGY | Facility: HOSPITAL | Age: 78
End: 2023-10-03
Payer: MEDICARE

## 2023-10-03 VITALS
BODY MASS INDEX: 24.75 KG/M2 | DIASTOLIC BLOOD PRESSURE: 69 MMHG | TEMPERATURE: 97.4 F | HEART RATE: 65 BPM | WEIGHT: 154 LBS | HEIGHT: 66 IN | SYSTOLIC BLOOD PRESSURE: 160 MMHG | RESPIRATION RATE: 16 BRPM | OXYGEN SATURATION: 99 %

## 2023-10-03 DIAGNOSIS — N39.0 URINARY TRACT INFECTION WITHOUT HEMATURIA, SITE UNSPECIFIED: Primary | ICD-10-CM

## 2023-10-03 PROCEDURE — 25810000003 SODIUM CHLORIDE 0.9 % SOLUTION: Performed by: NURSE PRACTITIONER

## 2023-10-03 PROCEDURE — 25010000002 ERTAPENEM PER 500 MG: Performed by: NURSE PRACTITIONER

## 2023-10-03 PROCEDURE — 96365 THER/PROPH/DIAG IV INF INIT: CPT

## 2023-10-03 RX ORDER — SODIUM CHLORIDE 9 MG/ML
250 INJECTION, SOLUTION INTRAVENOUS ONCE
Status: CANCELLED | OUTPATIENT
Start: 2023-10-03

## 2023-10-03 RX ORDER — SODIUM CHLORIDE 9 MG/ML
250 INJECTION, SOLUTION INTRAVENOUS ONCE
Status: COMPLETED | OUTPATIENT
Start: 2023-10-03 | End: 2023-10-03

## 2023-10-03 RX ADMIN — ERTAPENEM 1000 MG: 1 INJECTION INTRAMUSCULAR; INTRAVENOUS at 13:48

## 2023-10-03 RX ADMIN — SODIUM CHLORIDE 250 ML: 9 INJECTION, SOLUTION INTRAVENOUS at 13:47

## 2023-10-04 ENCOUNTER — INFUSION (OUTPATIENT)
Dept: ONCOLOGY | Facility: HOSPITAL | Age: 78
End: 2023-10-04
Payer: MEDICARE

## 2023-10-04 ENCOUNTER — TELEPHONE (OUTPATIENT)
Dept: UROLOGY | Facility: CLINIC | Age: 78
End: 2023-10-04
Payer: MEDICARE

## 2023-10-04 VITALS
OXYGEN SATURATION: 99 % | DIASTOLIC BLOOD PRESSURE: 66 MMHG | TEMPERATURE: 97.9 F | RESPIRATION RATE: 18 BRPM | HEART RATE: 63 BPM | SYSTOLIC BLOOD PRESSURE: 150 MMHG

## 2023-10-04 DIAGNOSIS — N39.0 URINARY TRACT INFECTION WITHOUT HEMATURIA, SITE UNSPECIFIED: Primary | ICD-10-CM

## 2023-10-04 PROCEDURE — 96365 THER/PROPH/DIAG IV INF INIT: CPT

## 2023-10-04 PROCEDURE — 25010000002 ERTAPENEM PER 500 MG: Performed by: NURSE PRACTITIONER

## 2023-10-04 PROCEDURE — 25810000003 SODIUM CHLORIDE 0.9 % SOLUTION: Performed by: NURSE PRACTITIONER

## 2023-10-04 RX ORDER — SODIUM CHLORIDE 9 MG/ML
250 INJECTION, SOLUTION INTRAVENOUS ONCE
Status: COMPLETED | OUTPATIENT
Start: 2023-10-04 | End: 2023-10-04

## 2023-10-04 RX ORDER — SODIUM CHLORIDE 9 MG/ML
250 INJECTION, SOLUTION INTRAVENOUS ONCE
Status: CANCELLED | OUTPATIENT
Start: 2023-10-04

## 2023-10-04 RX ADMIN — SODIUM CHLORIDE 250 ML: 9 INJECTION, SOLUTION INTRAVENOUS at 15:05

## 2023-10-04 RX ADMIN — ERTAPENEM 1000 MG: 1 INJECTION INTRAMUSCULAR; INTRAVENOUS at 15:24

## 2023-10-04 NOTE — TELEPHONE ENCOUNTER
Called and let the patient know that she is good to wait until her apt. Patient verbalized understanding. Patient enquired on getting D Mannose to see if it would help. Patient is wanting to hear from Nickie to hear her opinion on the OTC medication =.

## 2023-10-04 NOTE — TELEPHONE ENCOUNTER
Patient called because he last infusion is today. She wants to know if you want her to come in and give a urine today or if she should wait until her apt on the 20th? I have sent Nickie a message and let the patient know I will call her back.

## 2023-10-11 ENCOUNTER — ANESTHESIA EVENT (OUTPATIENT)
Dept: CARDIOLOGY | Facility: HOSPITAL | Age: 78
End: 2023-10-11
Payer: MEDICARE

## 2023-10-11 ENCOUNTER — HOSPITAL ENCOUNTER (OUTPATIENT)
Dept: CARDIOLOGY | Facility: HOSPITAL | Age: 78
Discharge: HOME | End: 2023-10-11
Payer: MEDICARE

## 2023-10-11 ENCOUNTER — ANESTHESIA (OUTPATIENT)
Dept: CARDIOLOGY | Facility: HOSPITAL | Age: 78
End: 2023-10-11
Payer: MEDICARE

## 2023-10-11 VITALS
OXYGEN SATURATION: 98 % | WEIGHT: 152 LBS | DIASTOLIC BLOOD PRESSURE: 69 MMHG | HEIGHT: 66 IN | BODY MASS INDEX: 24.43 KG/M2 | TEMPERATURE: 98 F | SYSTOLIC BLOOD PRESSURE: 153 MMHG | HEART RATE: 59 BPM | RESPIRATION RATE: 15 BRPM

## 2023-10-11 DIAGNOSIS — I48.0 PAF (PAROXYSMAL ATRIAL FIBRILLATION): ICD-10-CM

## 2023-10-11 PROCEDURE — 93325 DOPPLER ECHO COLOR FLOW MAPG: CPT

## 2023-10-11 PROCEDURE — 25010000002 PROPOFOL 1000 MG/100ML EMULSION: Performed by: NURSE ANESTHETIST, CERTIFIED REGISTERED

## 2023-10-11 PROCEDURE — 93321 DOPPLER ECHO F-UP/LMTD STD: CPT

## 2023-10-11 RX ORDER — SODIUM CHLORIDE 0.9 % (FLUSH) 0.9 %
10 SYRINGE (ML) INJECTION EVERY 12 HOURS SCHEDULED
Status: DISCONTINUED | OUTPATIENT
Start: 2023-10-11 | End: 2023-10-12 | Stop reason: HOSPADM

## 2023-10-11 RX ORDER — PROPOFOL 10 MG/ML
INJECTION, EMULSION INTRAVENOUS AS NEEDED
Status: DISCONTINUED | OUTPATIENT
Start: 2023-10-11 | End: 2023-10-11 | Stop reason: SURG

## 2023-10-11 RX ORDER — SODIUM CHLORIDE 0.9 % (FLUSH) 0.9 %
10 SYRINGE (ML) INJECTION AS NEEDED
Status: DISCONTINUED | OUTPATIENT
Start: 2023-10-11 | End: 2023-10-12 | Stop reason: HOSPADM

## 2023-10-11 RX ORDER — SODIUM CHLORIDE 9 MG/ML
20 INJECTION, SOLUTION INTRAVENOUS CONTINUOUS
Status: DISCONTINUED | OUTPATIENT
Start: 2023-10-11 | End: 2023-10-12 | Stop reason: HOSPADM

## 2023-10-11 RX ADMIN — SODIUM CHLORIDE: 900 INJECTION INTRAVENOUS at 09:24

## 2023-10-11 RX ADMIN — PROPOFOL 200 MG: 10 INJECTION, EMULSION INTRAVENOUS at 09:28

## 2023-10-11 NOTE — ANESTHESIA PREPROCEDURE EVALUATION
Anesthesia Evaluation     Patient summary reviewed   no history of anesthetic complications:   NPO Solid Status: > 8 hours  NPO Liquid Status: > 8 hours           Airway   Mallampati: I  TM distance: >3 FB  Neck ROM: full  No difficulty expected  Dental - normal exam     Pulmonary - negative pulmonary ROS and normal exam   Cardiovascular   Exercise tolerance: good (4-7 METS)    (+) hypertension well controlled less than 2 medications, dysrhythmias Atrial Fib      Neuro/Psych- negative ROS  GI/Hepatic/Renal/Endo - negative ROS     Musculoskeletal (-) negative ROS    Abdominal  - normal exam   Substance History - negative use     OB/GYN          Other - negative ROS                   Anesthesia Plan    ASA 2     MAC     intravenous induction     Anesthetic plan, risks, benefits, and alternatives have been provided, discussed and informed consent has been obtained with: patient.    CODE STATUS:

## 2023-10-11 NOTE — ANESTHESIA POSTPROCEDURE EVALUATION
Patient: Vane Pyle    Procedure Summary       Date: 10/11/23 Room / Location: [facility] CATH LAB; [facility] CARDIOLOGY    Anesthesia Start: 0928 Anesthesia Stop: 0947    Procedure: ADULT TRANSESOPHAGEAL ECHO (CASS) W/ CONT IF NECESSARY PER PROTOCOL Diagnosis:       PAF (paroxysmal atrial fibrillation)      (Guidance for Cardiac Intervention)    Scheduled Providers: Ad Ahmadi MD Provider: Efrain Malcolm CRNA    Anesthesia Type: MAC ASA Status: 2            Anesthesia Type: MAC    Vitals  No vitals data found for the desired time range.          Post Anesthesia Care and Evaluation    Patient location during evaluation: PHASE II  Level of consciousness: sleepy but conscious  Pain score: 0  Pain management: adequate  Anesthetic complications: No anesthetic complications  PONV Status: none  Cardiovascular status: acceptable  Respiratory status: acceptable  Hydration status: acceptable

## 2023-10-13 ENCOUNTER — TELEPHONE (OUTPATIENT)
Dept: UROLOGY | Facility: CLINIC | Age: 78
End: 2023-10-13
Payer: MEDICARE

## 2023-10-13 ENCOUNTER — PROCEDURE VISIT (OUTPATIENT)
Dept: UROLOGY | Facility: CLINIC | Age: 78
End: 2023-10-13
Payer: MEDICARE

## 2023-10-13 DIAGNOSIS — N39.0 URINARY TRACT INFECTION WITHOUT HEMATURIA, SITE UNSPECIFIED: Primary | ICD-10-CM

## 2023-10-13 PROCEDURE — 87186 SC STD MICRODIL/AGAR DIL: CPT

## 2023-10-13 PROCEDURE — 87086 URINE CULTURE/COLONY COUNT: CPT

## 2023-10-13 PROCEDURE — 87088 URINE BACTERIA CULTURE: CPT

## 2023-10-13 NOTE — PROGRESS NOTES
Pt. Of Nickie ANDERSON Here for in and out cath specimen collection for urine culture. Pt. Cleaned with Betadine and in and out catheter inserted and specimen obtained. Pt. Advised we will call her Monday with results. Lucas VILLASEÑOR in office at the time of procedure. MARIBEL Garner RN

## 2023-10-13 NOTE — PROGRESS NOTES
Subjective    Ms. Pyle is 78 y.o. female    Chief Complaint: recurrent uti    History of Present Illness    78-year-old female established patient in for follow-up regarding ongoing fitzgerald with UTI. Pt recently received 7 day IV invanz d/t urine culture positive Klebsiella ESBLfor which completed last dose 10/4/2023. Pt now with return of symptoms consisting of suprapubic discomfort and mid back pain for which presented to our office 2 days ago where a repeat urine was completed and was sent for culture returning this morning revealing E. Coli. denies fever or chills.     recent hospital encounter due to a fall with loss of consciousness with brain bleed where patient was found to have a urinary tract infection likely contributing to the fall.  Was treated with IV antibiotic therapy and later transition to cefdinir x10 days fall occurred August 28, 2023 was last flighted to [facility] in [place].  Remains on Myrbetriq for OAB.  Remains on 3 times weekly vaginal estrogen cream Premarin noting improvement and OTC probiotic and d-mannose.    The following portions of the patient's history were reviewed and updated as appropriate: allergies, current medications, past family history, past medical history, past social history, past surgical history and problem list.    Review of Systems   Constitutional:  Negative for chills and fever.   Gastrointestinal:  Negative for abdominal pain, anal bleeding and blood in stool.   Genitourinary:  Positive for flank pain. Negative for dysuria and hematuria.   Musculoskeletal:  Positive for back pain.         Current Outpatient Medications:     amiodarone (PACERONE) 200 MG tablet, Take 1 tablet by mouth Daily., Disp: , Rfl:     BIOTIN 5000 PO, Take  by mouth., Disp: , Rfl:     Calcium Carbonate-Vitamin D 600-200 MG-UNIT tablet, Calcium 600 with Vitamin D3 600 mg (1,500 mg)-200 unit tablet  Take 1 tablet every day by oral route., Disp: , Rfl:     cefdinir (OMNICEF) 300  MG capsule, Take 1 capsule by mouth 2 (Two) Times a Day., Disp: 20 capsule, Rfl: 0    Cholecalciferol (VITAMIN D3) 50 MCG (2000 UT) capsule, Take 1 capsule by mouth Daily., Disp: , Rfl:     conjugated estrogens (Premarin) 0.625 MG/GM vaginal cream, Insert 0,5gm Monday, Wednesday and Friday, Disp: 1 each, Rfl: 11    Cranberry 1000 MG capsule, Take  by mouth., Disp: , Rfl:     dicyclomine (BENTYL) 10 MG capsule, Take 1 capsule by mouth As Needed., Disp: , Rfl:     dilTIAZem (CARDIZEM) 30 MG tablet, Take 1 tablet by mouth As Needed (HR sustained over 150 bpm)., Disp: 90 tablet, Rfl: 4    ferrous sulfate 324 (65 Fe) MG tablet delayed-release EC tablet, Take 1 tablet by mouth Daily With Breakfast., Disp: , Rfl:     Glucosamine 500 MG capsule, Take  by mouth., Disp: , Rfl:     lisinopril (PRINIVIL,ZESTRIL) 20 MG tablet, Take 1 tablet by mouth Daily., Disp: , Rfl:     Magnesium Gluconate (MAGNESIUM 27 PO), Take  by mouth., Disp: , Rfl:     Mirabegron ER (MYRBETRIQ) 50 MG tablet sustained-release 24 hour 24 hr tablet, Take 50 mg by mouth Daily., Disp: 90 tablet, Rfl: 3    Multiple Vitamins-Minerals (PRESERVISION AREDS 2 PO), Take 1 tablet by mouth Daily., Disp: , Rfl:     Multiple Vitamins-Minerals (ZINC PO), Take  by mouth., Disp: , Rfl:     Omega-3 Fatty Acids (Fish Oil Double Strength) 1200 MG capsule, Take 3,600 mg by mouth 2 (two) times a day., Disp: , Rfl:     Potassium 99 MG tablet, Take  by mouth., Disp: , Rfl:     rivaroxaban (XARELTO) 20 MG tablet, Take 1 tablet by mouth Daily., Disp: , Rfl:     vitamin B-12 (CYANOCOBALAMIN) 1000 MCG tablet, Take 5 tablets by mouth Daily., Disp: , Rfl:     vitamin E 400 UNIT capsule, Take 1 capsule by mouth Daily., Disp: , Rfl:     Zinc 50 MG tablet, Take 1 tablet by mouth Daily., Disp: , Rfl:     Past Medical History:   Diagnosis Date    A-fib     Hypertension     Urinary tract infection        Past Surgical History:   Procedure Laterality Date    APPENDECTOMY      BLADDER  "SURGERY      EYE SURGERY      FOOT FRACTURE SURGERY      HYSTERECTOMY         Social History     Socioeconomic History    Marital status:    Tobacco Use    Smoking status: Never    Smokeless tobacco: Never   Vaping Use    Vaping Use: Never used   Substance and Sexual Activity    Alcohol use: No    Drug use: No    Sexual activity: Defer       Family History   Problem Relation Age of Onset    Stroke Sister     Hypertension Sister     Stroke Mother     Hypertension Mother     Stroke Father     Hypertension Father     Arrhythmia Brother        Objective    Temp 97.8 °F (36.6 °C)   Ht 167.6 cm (65.98\")   Wt 68.9 kg (152 lb)   BMI 24.55 kg/m²     Physical Exam  Nursing note reviewed.   Constitutional:       General: She is not in acute distress.     Appearance: Normal appearance. She is not ill-appearing.   HENT:      Nose: No congestion.   Abdominal:      Tenderness: There is no right CVA tenderness or left CVA tenderness.      Hernia: No hernia is present.   Skin:     General: Skin is warm and dry.   Neurological:      Mental Status: She is alert and oriented to person, place, and time.   Psychiatric:         Mood and Affect: Mood normal.         Behavior: Behavior normal.             Results for orders placed or performed in visit on 10/13/23   Urine Culture - Urine, Urine, Catheter In/Out    Specimen: Urine, Catheter In/Out   Result Value Ref Range    Urine Culture >100,000 CFU/mL Escherichia coli (A)        Susceptibility    Escherichia coli - NATHAN     Ampicillin  Susceptible ug/ml     Ampicillin + Sulbactam  Susceptible ug/ml     Cefazolin  Susceptible ug/ml     Cefepime  Susceptible ug/ml     Ceftazidime  Susceptible ug/ml     Ceftriaxone  Susceptible ug/ml     Gentamicin  Susceptible ug/ml     Levofloxacin  Susceptible ug/ml     Nitrofurantoin  Susceptible ug/ml     Piperacillin + Tazobactam  Susceptible ug/ml     Trimethoprim + Sulfamethoxazole  Susceptible ug/ml     Assessment and Plan    Diagnoses and " all orders for this visit:    1. Urinary tract infection without hematuria, site unspecified (Primary)  -     Cancel: POC Urinalysis Dipstick, Multipro    78-year-old female established patient in for follow-up regarding ongoing fitzgerald with UTI. Pt recently received 7 day IV invanz d/t urine culture positive Klebsiella ESBLfor which completed last dose 10/4/2023. Pt now with return of symptoms consisting of suprapubic discomfort and mid back pain.    Presented for urinalysis to send for culture this past Friday urine culture returned this a.m. revealing E. coli bacteria-have sent in a prescription of Omnicef x10 days to patient's pharmacy    Patient to continue Myrbetriq 25 mg daily as well as vaginal estrogen cream 2-3 times weekly     We will have patient return in 2 weeks repeat UA

## 2023-10-13 NOTE — TELEPHONE ENCOUNTER
Patient called in stating she thought she had a UTI again. Patient leaves an hour away and we did not have an opening. Nickie offered her to come in for a nurse visit and get a in & out Specimen. Or she could go to a ER or urgent care closer to her. Patient verbalized she would try to make it here before 4. If not she would go some where else.

## 2023-10-15 LAB — BACTERIA SPEC AEROBE CULT: ABNORMAL

## 2023-10-16 ENCOUNTER — OFFICE VISIT (OUTPATIENT)
Dept: CARDIOLOGY | Facility: CLINIC | Age: 78
End: 2023-10-16
Payer: MEDICARE

## 2023-10-16 ENCOUNTER — OFFICE VISIT (OUTPATIENT)
Dept: UROLOGY | Facility: CLINIC | Age: 78
End: 2023-10-16
Payer: MEDICARE

## 2023-10-16 VITALS — HEIGHT: 66 IN | BODY MASS INDEX: 24.43 KG/M2 | TEMPERATURE: 97.8 F | WEIGHT: 152 LBS

## 2023-10-16 VITALS
BODY MASS INDEX: 24.27 KG/M2 | SYSTOLIC BLOOD PRESSURE: 130 MMHG | HEART RATE: 68 BPM | OXYGEN SATURATION: 98 % | HEIGHT: 66 IN | WEIGHT: 151 LBS | DIASTOLIC BLOOD PRESSURE: 80 MMHG

## 2023-10-16 DIAGNOSIS — I10 PRIMARY HYPERTENSION: Chronic | ICD-10-CM

## 2023-10-16 DIAGNOSIS — N30.00 ACUTE CYSTITIS WITHOUT HEMATURIA: Primary | ICD-10-CM

## 2023-10-16 DIAGNOSIS — Z79.01 CURRENT USE OF LONG TERM ANTICOAGULATION: ICD-10-CM

## 2023-10-16 DIAGNOSIS — N39.0 URINARY TRACT INFECTION WITHOUT HEMATURIA, SITE UNSPECIFIED: Primary | ICD-10-CM

## 2023-10-16 DIAGNOSIS — Z86.79 HISTORY OF SUBDURAL HEMATOMA: ICD-10-CM

## 2023-10-16 DIAGNOSIS — I48.0 PAROXYSMAL ATRIAL FIBRILLATION: Primary | Chronic | ICD-10-CM

## 2023-10-16 PROCEDURE — 1159F MED LIST DOCD IN RCRD: CPT

## 2023-10-16 PROCEDURE — 99214 OFFICE O/P EST MOD 30 MIN: CPT | Performed by: INTERNAL MEDICINE

## 2023-10-16 PROCEDURE — 81001 URINALYSIS AUTO W/SCOPE: CPT

## 2023-10-16 PROCEDURE — 99214 OFFICE O/P EST MOD 30 MIN: CPT

## 2023-10-16 PROCEDURE — 3079F DIAST BP 80-89 MM HG: CPT | Performed by: INTERNAL MEDICINE

## 2023-10-16 PROCEDURE — 3075F SYST BP GE 130 - 139MM HG: CPT | Performed by: INTERNAL MEDICINE

## 2023-10-16 PROCEDURE — 1160F RVW MEDS BY RX/DR IN RCRD: CPT

## 2023-10-16 RX ORDER — CEFDINIR 300 MG/1
300 CAPSULE ORAL 2 TIMES DAILY
Qty: 20 CAPSULE | Refills: 0 | Status: SHIPPED | OUTPATIENT
Start: 2023-10-16

## 2023-10-16 NOTE — PROGRESS NOTES
Reason for Visit: Left atrial appendage closure shared decision making.     HPI:  Vane Pyle is a 78 y.o. female is here today for Left atrial appendage closure shared decision making.  She follows in cardiology clinic with Dr. Ahmadi for paroxysmal atrial fibrillation.  She recently fell at the end of August and hit her head, with subsequent development of a subdural hematoma.  She passed out prior to falling while she was talking on the phone.  She is therefore felt to be a poor candidate for chronic anticoagulation and is being worked up for alternative means of thromboembolic prophylaxis with left atrial appendage occlusion with the Watchman device.  She had CASS done on 10/11/2023 showing no evidence of left atrial appendage thrombus.        Patient Active Problem List   Diagnosis    A-fib    Hypertension    Current use of long term anticoagulation    UTI (urinary tract infection)       Social History     Tobacco Use    Smoking status: Never    Smokeless tobacco: Never   Vaping Use    Vaping Use: Never used   Substance Use Topics    Alcohol use: No    Drug use: No       Family History   Problem Relation Age of Onset    Stroke Sister     Hypertension Sister     Stroke Mother     Hypertension Mother     Stroke Father     Hypertension Father     Arrhythmia Brother        The following portions of the patient's history were reviewed and updated as appropriate: allergies, current medications, past family history, past medical history, past social history, past surgical history, and problem list.      Current Outpatient Medications:     amiodarone (PACERONE) 200 MG tablet, Take 1 tablet by mouth Daily., Disp: , Rfl:     BIOTIN 5000 PO, Take  by mouth., Disp: , Rfl:     Calcium Carbonate-Vitamin D 600-200 MG-UNIT tablet, Calcium 600 with Vitamin D3 600 mg (1,500 mg)-200 unit tablet  Take 1 tablet every day by oral route., Disp: , Rfl:     cefdinir (OMNICEF) 300 MG capsule, Take 1 capsule by mouth 2 (Two) Times  a Day., Disp: 20 capsule, Rfl: 0    Cholecalciferol (VITAMIN D3) 50 MCG (2000 UT) capsule, Take 1 capsule by mouth Daily., Disp: , Rfl:     conjugated estrogens (Premarin) 0.625 MG/GM vaginal cream, Insert 0,5gm Monday, Wednesday and Friday, Disp: 1 each, Rfl: 11    Cranberry 1000 MG capsule, Take  by mouth., Disp: , Rfl:     dicyclomine (BENTYL) 10 MG capsule, Take 1 capsule by mouth As Needed., Disp: , Rfl:     dilTIAZem (CARDIZEM) 30 MG tablet, Take 1 tablet by mouth As Needed (HR sustained over 150 bpm)., Disp: 90 tablet, Rfl: 4    ferrous sulfate 324 (65 Fe) MG tablet delayed-release EC tablet, Take 1 tablet by mouth Daily With Breakfast., Disp: , Rfl:     Glucosamine 500 MG capsule, Take  by mouth., Disp: , Rfl:     lisinopril (PRINIVIL,ZESTRIL) 20 MG tablet, Take 1 tablet by mouth Daily., Disp: , Rfl:     Magnesium Gluconate (MAGNESIUM 27 PO), Take  by mouth., Disp: , Rfl:     Mirabegron ER (MYRBETRIQ) 50 MG tablet sustained-release 24 hour 24 hr tablet, Take 50 mg by mouth Daily., Disp: 90 tablet, Rfl: 3    Multiple Vitamins-Minerals (PRESERVISION AREDS 2 PO), Take 1 tablet by mouth Daily., Disp: , Rfl:     Multiple Vitamins-Minerals (ZINC PO), Take  by mouth., Disp: , Rfl:     Omega-3 Fatty Acids (Fish Oil Double Strength) 1200 MG capsule, Take 3,600 mg by mouth 2 (two) times a day., Disp: , Rfl:     Potassium 99 MG tablet, Take  by mouth., Disp: , Rfl:     rivaroxaban (XARELTO) 20 MG tablet, Take 1 tablet by mouth Daily., Disp: , Rfl:     vitamin B-12 (CYANOCOBALAMIN) 1000 MCG tablet, Take 5 tablets by mouth Daily., Disp: , Rfl:     vitamin E 400 UNIT capsule, Take 1 capsule by mouth Daily., Disp: , Rfl:     Zinc 50 MG tablet, Take 1 tablet by mouth Daily., Disp: , Rfl:     Review of Systems   Constitutional: Negative for chills and fever.   Cardiovascular:  Positive for syncope. Negative for chest pain, irregular heartbeat and paroxysmal nocturnal dyspnea.   Respiratory:  Negative for cough and shortness  "of breath.    Skin:  Negative for rash.   Musculoskeletal:  Positive for falls.   Gastrointestinal:  Negative for abdominal pain and heartburn.   Neurological:  Negative for dizziness and numbness.       Objective   /80 (BP Location: Left arm, Patient Position: Sitting, Cuff Size: Adult)   Pulse 68   Ht 167.6 cm (65.98\")   Wt 68.5 kg (151 lb)   SpO2 98%   BMI 24.38 kg/m²   Constitutional:       Appearance: Well-developed.   HENT:      Head: Normocephalic and atraumatic.   Pulmonary:      Effort: Pulmonary effort is normal.      Breath sounds: Normal breath sounds.   Cardiovascular:      Normal rate. Irregularly irregular rhythm.      Murmurs: There is no murmur.      No gallop.  No click.   Edema:     Peripheral edema absent.   Skin:     General: Skin is warm and dry.   Neurological:      Mental Status: Alert and oriented to person, place, and time.       Procedures  CHADS-VASc Risk Assessment              4 Total Score    1 Hypertension    2 Age >/= 75    1 Sex: Female        Criteria that do not apply:    CHF    DM    PRIOR STROKE/TIA/THROMBO    Vascular Disease    Age 65-74             ICD-10-CM ICD-9-CM   1. Paroxysmal atrial fibrillation  I48.0 427.31   2. Current use of long term anticoagulation  Z79.01 V58.61   3. Primary hypertension  I10 401.9   4. History of subdural hematoma  Z86.79 V12.59         Assessment/Plan:  Based on the history, it has been determined that the patient is a poor candidate for long-term oral anticoagulation.  The patient may, however, be tolerant to short-term anticoagulation as necessary.    Specifically regarding anticoagulation they have demonstrated: fall, Subdural hematoma    We have discussed that you need stroke and bleeding risk on and off anticoagulation.  The individual [id number] stroke risk store is 4 (hypertension, age > 75, female sex), indicating an adjusted stroke rate of 4%/year.    Based on both stroke and bleeding risk, shared decision has been made to " pursue closure of the left atrial appendage is a safe, effective alternative to long-term oral anticoagulation therapy for stroke prophylaxis.  This will reduce her long-term risk of incidence of bleeding.  Information regarding left atrial appendage closure and shared decision making were provided to patient.

## 2023-10-16 NOTE — PROGRESS NOTES
Again patient appears to have infection however is not currently showing multidrug resistance.  Will send in a prescription for Omnicef

## 2023-10-16 NOTE — H&P (VIEW-ONLY)
Reason for Visit: Left atrial appendage closure shared decision making.     HPI:  Vane Pyle is a 78 y.o. female is here today for Left atrial appendage closure shared decision making.  She follows in cardiology clinic with Dr. Ahmadi for paroxysmal atrial fibrillation.  She recently fell at the end of August and hit her head, with subsequent development of a subdural hematoma.  She passed out prior to falling while she was talking on the phone.  She is therefore felt to be a poor candidate for chronic anticoagulation and is being worked up for alternative means of thromboembolic prophylaxis with left atrial appendage occlusion with the Watchman device.  She had CASS done on 10/11/2023 showing no evidence of left atrial appendage thrombus.        Patient Active Problem List   Diagnosis    A-fib    Hypertension    Current use of long term anticoagulation    UTI (urinary tract infection)       Social History     Tobacco Use    Smoking status: Never    Smokeless tobacco: Never   Vaping Use    Vaping Use: Never used   Substance Use Topics    Alcohol use: No    Drug use: No       Family History   Problem Relation Age of Onset    Stroke Sister     Hypertension Sister     Stroke Mother     Hypertension Mother     Stroke Father     Hypertension Father     Arrhythmia Brother        The following portions of the patient's history were reviewed and updated as appropriate: allergies, current medications, past family history, past medical history, past social history, past surgical history, and problem list.      Current Outpatient Medications:     amiodarone (PACERONE) 200 MG tablet, Take 1 tablet by mouth Daily., Disp: , Rfl:     BIOTIN 5000 PO, Take  by mouth., Disp: , Rfl:     Calcium Carbonate-Vitamin D 600-200 MG-UNIT tablet, Calcium 600 with Vitamin D3 600 mg (1,500 mg)-200 unit tablet  Take 1 tablet every day by oral route., Disp: , Rfl:     cefdinir (OMNICEF) 300 MG capsule, Take 1 capsule by mouth 2 (Two) Times  a Day., Disp: 20 capsule, Rfl: 0    Cholecalciferol (VITAMIN D3) 50 MCG (2000 UT) capsule, Take 1 capsule by mouth Daily., Disp: , Rfl:     conjugated estrogens (Premarin) 0.625 MG/GM vaginal cream, Insert 0,5gm Monday, Wednesday and Friday, Disp: 1 each, Rfl: 11    Cranberry 1000 MG capsule, Take  by mouth., Disp: , Rfl:     dicyclomine (BENTYL) 10 MG capsule, Take 1 capsule by mouth As Needed., Disp: , Rfl:     dilTIAZem (CARDIZEM) 30 MG tablet, Take 1 tablet by mouth As Needed (HR sustained over 150 bpm)., Disp: 90 tablet, Rfl: 4    ferrous sulfate 324 (65 Fe) MG tablet delayed-release EC tablet, Take 1 tablet by mouth Daily With Breakfast., Disp: , Rfl:     Glucosamine 500 MG capsule, Take  by mouth., Disp: , Rfl:     lisinopril (PRINIVIL,ZESTRIL) 20 MG tablet, Take 1 tablet by mouth Daily., Disp: , Rfl:     Magnesium Gluconate (MAGNESIUM 27 PO), Take  by mouth., Disp: , Rfl:     Mirabegron ER (MYRBETRIQ) 50 MG tablet sustained-release 24 hour 24 hr tablet, Take 50 mg by mouth Daily., Disp: 90 tablet, Rfl: 3    Multiple Vitamins-Minerals (PRESERVISION AREDS 2 PO), Take 1 tablet by mouth Daily., Disp: , Rfl:     Multiple Vitamins-Minerals (ZINC PO), Take  by mouth., Disp: , Rfl:     Omega-3 Fatty Acids (Fish Oil Double Strength) 1200 MG capsule, Take 3,600 mg by mouth 2 (two) times a day., Disp: , Rfl:     Potassium 99 MG tablet, Take  by mouth., Disp: , Rfl:     rivaroxaban (XARELTO) 20 MG tablet, Take 1 tablet by mouth Daily., Disp: , Rfl:     vitamin B-12 (CYANOCOBALAMIN) 1000 MCG tablet, Take 5 tablets by mouth Daily., Disp: , Rfl:     vitamin E 400 UNIT capsule, Take 1 capsule by mouth Daily., Disp: , Rfl:     Zinc 50 MG tablet, Take 1 tablet by mouth Daily., Disp: , Rfl:     Review of Systems   Constitutional: Negative for chills and fever.   Cardiovascular:  Positive for syncope. Negative for chest pain, irregular heartbeat and paroxysmal nocturnal dyspnea.   Respiratory:  Negative for cough and shortness  "of breath.    Skin:  Negative for rash.   Musculoskeletal:  Positive for falls.   Gastrointestinal:  Negative for abdominal pain and heartburn.   Neurological:  Negative for dizziness and numbness.       Objective   /80 (BP Location: Left arm, Patient Position: Sitting, Cuff Size: Adult)   Pulse 68   Ht 167.6 cm (65.98\")   Wt 68.5 kg (151 lb)   SpO2 98%   BMI 24.38 kg/m²   Constitutional:       Appearance: Well-developed.   HENT:      Head: Normocephalic and atraumatic.   Pulmonary:      Effort: Pulmonary effort is normal.      Breath sounds: Normal breath sounds.   Cardiovascular:      Normal rate. Irregularly irregular rhythm.      Murmurs: There is no murmur.      No gallop.  No click.   Edema:     Peripheral edema absent.   Skin:     General: Skin is warm and dry.   Neurological:      Mental Status: Alert and oriented to person, place, and time.       Procedures  CHADS-VASc Risk Assessment              4 Total Score    1 Hypertension    2 Age >/= 75    1 Sex: Female        Criteria that do not apply:    CHF    DM    PRIOR STROKE/TIA/THROMBO    Vascular Disease    Age 65-74             ICD-10-CM ICD-9-CM   1. Paroxysmal atrial fibrillation  I48.0 427.31   2. Current use of long term anticoagulation  Z79.01 V58.61   3. Primary hypertension  I10 401.9   4. History of subdural hematoma  Z86.79 V12.59         Assessment/Plan:  Based on the history, it has been determined that the patient is a poor candidate for long-term oral anticoagulation.  The patient may, however, be tolerant to short-term anticoagulation as necessary.    Specifically regarding anticoagulation they have demonstrated: fall, Subdural hematoma    We have discussed that you need stroke and bleeding risk on and off anticoagulation.  The individual [id number] stroke risk store is 4 (hypertension, age > 75, female sex), indicating an adjusted stroke rate of 4%/year.    Based on both stroke and bleeding risk, shared decision has been made to " pursue closure of the left atrial appendage is a safe, effective alternative to long-term oral anticoagulation therapy for stroke prophylaxis.  This will reduce her long-term risk of incidence of bleeding.  Information regarding left atrial appendage closure and shared decision making were provided to patient.

## 2023-10-16 NOTE — LETTER
October 16, 2023     Harjeet Pedraza DO  [address] [facility] [place] [address]    Patient: Vane Pyle   YOB: 1945   Date of Visit: 10/16/2023       Dear Harjeet Pedraza DO    Vane Pyle was in my office today. Below is a copy of my note.    If you have questions, please do not hesitate to call me. I look forward to following Vane along with you.         Sincerely,        Lucas Bridges MD        CC: Ad Ahmadi MD      Reason for Visit: Left atrial appendage closure shared decision making.     HPI:  Vane Pyle is a 78 y.o. female is here today for Left atrial appendage closure shared decision making.  She follows in cardiology clinic with Dr. Ahmadi for paroxysmal atrial fibrillation.  She recently fell at the end of August and hit her head, with subsequent development of a subdural hematoma.  She passed out prior to falling while she was talking on the phone.  She is therefore felt to be a poor candidate for chronic anticoagulation and is being worked up for alternative means of thromboembolic prophylaxis with left atrial appendage occlusion with the Watchman device.  She had CASS done on 10/11/2023 showing no evidence of left atrial appendage thrombus.        Patient Active Problem List   Diagnosis   • A-fib   • Hypertension   • Current use of long term anticoagulation   • UTI (urinary tract infection)       Social History     Tobacco Use   • Smoking status: Never   • Smokeless tobacco: Never   Vaping Use   • Vaping Use: Never used   Substance Use Topics   • Alcohol use: No   • Drug use: No       Family History   Problem Relation Age of Onset   • Stroke Sister    • Hypertension Sister    • Stroke Mother    • Hypertension Mother    • Stroke Father    • Hypertension Father    • Arrhythmia Brother        The following portions of the patient's history were reviewed and updated as appropriate: allergies, current medications, past family history, past medical history, past  social history, past surgical history, and problem list.      Current Outpatient Medications:   •  amiodarone (PACERONE) 200 MG tablet, Take 1 tablet by mouth Daily., Disp: , Rfl:   •  BIOTIN 5000 PO, Take  by mouth., Disp: , Rfl:   •  Calcium Carbonate-Vitamin D 600-200 MG-UNIT tablet, Calcium 600 with Vitamin D3 600 mg (1,500 mg)-200 unit tablet  Take 1 tablet every day by oral route., Disp: , Rfl:   •  cefdinir (OMNICEF) 300 MG capsule, Take 1 capsule by mouth 2 (Two) Times a Day., Disp: 20 capsule, Rfl: 0  •  Cholecalciferol (VITAMIN D3) 50 MCG (2000 UT) capsule, Take 1 capsule by mouth Daily., Disp: , Rfl:   •  conjugated estrogens (Premarin) 0.625 MG/GM vaginal cream, Insert 0,5gm Monday, Wednesday and Friday, Disp: 1 each, Rfl: 11  •  Cranberry 1000 MG capsule, Take  by mouth., Disp: , Rfl:   •  dicyclomine (BENTYL) 10 MG capsule, Take 1 capsule by mouth As Needed., Disp: , Rfl:   •  dilTIAZem (CARDIZEM) 30 MG tablet, Take 1 tablet by mouth As Needed (HR sustained over 150 bpm)., Disp: 90 tablet, Rfl: 4  •  ferrous sulfate 324 (65 Fe) MG tablet delayed-release EC tablet, Take 1 tablet by mouth Daily With Breakfast., Disp: , Rfl:   •  Glucosamine 500 MG capsule, Take  by mouth., Disp: , Rfl:   •  lisinopril (PRINIVIL,ZESTRIL) 20 MG tablet, Take 1 tablet by mouth Daily., Disp: , Rfl:   •  Magnesium Gluconate (MAGNESIUM 27 PO), Take  by mouth., Disp: , Rfl:   •  Mirabegron ER (MYRBETRIQ) 50 MG tablet sustained-release 24 hour 24 hr tablet, Take 50 mg by mouth Daily., Disp: 90 tablet, Rfl: 3  •  Multiple Vitamins-Minerals (PRESERVISION AREDS 2 PO), Take 1 tablet by mouth Daily., Disp: , Rfl:   •  Multiple Vitamins-Minerals (ZINC PO), Take  by mouth., Disp: , Rfl:   •  Omega-3 Fatty Acids (Fish Oil Double Strength) 1200 MG capsule, Take 3,600 mg by mouth 2 (two) times a day., Disp: , Rfl:   •  Potassium 99 MG tablet, Take  by mouth., Disp: , Rfl:   •  rivaroxaban (XARELTO) 20 MG tablet, Take 1 tablet by mouth  "Daily., Disp: , Rfl:   •  vitamin B-12 (CYANOCOBALAMIN) 1000 MCG tablet, Take 5 tablets by mouth Daily., Disp: , Rfl:   •  vitamin E 400 UNIT capsule, Take 1 capsule by mouth Daily., Disp: , Rfl:   •  Zinc 50 MG tablet, Take 1 tablet by mouth Daily., Disp: , Rfl:     Review of Systems   Constitutional: Negative for chills and fever.   Cardiovascular:  Positive for syncope. Negative for chest pain, irregular heartbeat and paroxysmal nocturnal dyspnea.   Respiratory:  Negative for cough and shortness of breath.    Skin:  Negative for rash.   Musculoskeletal:  Positive for falls.   Gastrointestinal:  Negative for abdominal pain and heartburn.   Neurological:  Negative for dizziness and numbness.       Objective  /80 (BP Location: Left arm, Patient Position: Sitting, Cuff Size: Adult)   Pulse 68   Ht 167.6 cm (65.98\")   Wt 68.5 kg (151 lb)   SpO2 98%   BMI 24.38 kg/m²   Constitutional:       Appearance: Well-developed.   HENT:      Head: Normocephalic and atraumatic.   Pulmonary:      Effort: Pulmonary effort is normal.      Breath sounds: Normal breath sounds.   Cardiovascular:      Normal rate. Irregularly irregular rhythm.      Murmurs: There is no murmur.      No gallop.  No click.   Edema:     Peripheral edema absent.   Skin:     General: Skin is warm and dry.   Neurological:      Mental Status: Alert and oriented to person, place, and time.       Procedures  CHADS-VASc Risk Assessment              4 Total Score    1 Hypertension    2 Age >/= 75    1 Sex: Female        Criteria that do not apply:    CHF    DM    PRIOR STROKE/TIA/THROMBO    Vascular Disease    Age 65-74             ICD-10-CM ICD-9-CM   1. Paroxysmal atrial fibrillation  I48.0 427.31   2. Current use of long term anticoagulation  Z79.01 V58.61   3. Primary hypertension  I10 401.9   4. History of subdural hematoma  Z86.79 V12.59         Assessment/Plan:  Based on the history, it has been determined that the patient is a poor candidate for " long-term oral anticoagulation.  The patient may, however, be tolerant to short-term anticoagulation as necessary.    Specifically regarding anticoagulation they have demonstrated: fall, Subdural hematoma    We have discussed that you need stroke and bleeding risk on and off anticoagulation.  The individual [id number] stroke risk store is 4 (hypertension, age > 75, female sex), indicating an adjusted stroke rate of 4%/year.    Based on both stroke and bleeding risk, shared decision has been made to pursue closure of the left atrial appendage is a safe, effective alternative to long-term oral anticoagulation therapy for stroke prophylaxis.  This will reduce her long-term risk of incidence of bleeding.  Information regarding left atrial appendage closure and shared decision making were provided to patient.

## 2023-10-17 ENCOUNTER — PREP FOR SURGERY (OUTPATIENT)
Dept: OTHER | Facility: HOSPITAL | Age: 78
End: 2023-10-17
Payer: MEDICARE

## 2023-10-17 DIAGNOSIS — I48.0 PAF (PAROXYSMAL ATRIAL FIBRILLATION): Primary | ICD-10-CM

## 2023-10-17 RX ORDER — SODIUM CHLORIDE, SODIUM LACTATE, POTASSIUM CHLORIDE, CALCIUM CHLORIDE 600; 310; 30; 20 MG/100ML; MG/100ML; MG/100ML; MG/100ML
1 INJECTION, SOLUTION INTRAVENOUS CONTINUOUS
OUTPATIENT
Start: 2023-10-17

## 2023-10-17 RX ORDER — ASPIRIN 81 MG/1
324 TABLET, CHEWABLE ORAL ONCE
OUTPATIENT
Start: 2023-10-17 | End: 2023-10-17

## 2023-10-17 RX ORDER — SODIUM CHLORIDE 0.9 % (FLUSH) 0.9 %
10 SYRINGE (ML) INJECTION EVERY 12 HOURS SCHEDULED
OUTPATIENT
Start: 2023-10-17

## 2023-10-17 RX ORDER — SODIUM CHLORIDE 0.9 % (FLUSH) 0.9 %
10 SYRINGE (ML) INJECTION AS NEEDED
OUTPATIENT
Start: 2023-10-17

## 2023-10-23 ENCOUNTER — HOSPITAL ENCOUNTER (OUTPATIENT)
Dept: GENERAL RADIOLOGY | Facility: HOSPITAL | Age: 78
Discharge: HOME | End: 2023-10-23
Payer: MEDICARE

## 2023-10-23 ENCOUNTER — HOSPITAL ENCOUNTER (OUTPATIENT)
Dept: CARDIOLOGY | Facility: HOSPITAL | Age: 78
Discharge: HOME | End: 2023-10-23
Payer: MEDICARE

## 2023-10-23 DIAGNOSIS — I48.0 PAF (PAROXYSMAL ATRIAL FIBRILLATION): ICD-10-CM

## 2023-10-23 LAB
ABO GROUP BLD: NORMAL
ALBUMIN SERPL-MCNC: 4.6 G/DL (ref 3.5–5.2)
ALBUMIN/GLOB SERPL: 1.6 G/DL
ALP SERPL-CCNC: 108 U/L (ref 39–117)
ALT SERPL W P-5'-P-CCNC: 11 U/L (ref 1–33)
ANION GAP SERPL CALCULATED.3IONS-SCNC: 10 MMOL/L (ref 5–15)
AST SERPL-CCNC: 23 U/L (ref 1–32)
BASOPHILS # BLD AUTO: 0.05 10*3/MM3 (ref 0–0.2)
BASOPHILS NFR BLD AUTO: 0.7 % (ref 0–1.5)
BILIRUB SERPL-MCNC: 0.5 MG/DL (ref 0–1.2)
BLD GP AB SCN SERPL QL: NEGATIVE
BUN SERPL-MCNC: 16 MG/DL (ref 8–23)
BUN/CREAT SERPL: 22.9 (ref 7–25)
CALCIUM SPEC-SCNC: 9.7 MG/DL (ref 8.6–10.5)
CHLORIDE SERPL-SCNC: 105 MMOL/L (ref 98–107)
CO2 SERPL-SCNC: 29 MMOL/L (ref 22–29)
CREAT SERPL-MCNC: 0.7 MG/DL (ref 0.57–1)
DEPRECATED RDW RBC AUTO: 47.4 FL (ref 37–54)
EGFRCR SERPLBLD CKD-EPI 2021: 89.2 ML/MIN/1.73
EOSINOPHIL # BLD AUTO: 0.1 10*3/MM3 (ref 0–0.4)
EOSINOPHIL NFR BLD AUTO: 1.4 % (ref 0.3–6.2)
ERYTHROCYTE [DISTWIDTH] IN BLOOD BY AUTOMATED COUNT: 13.8 % (ref 12.3–15.4)
GLOBULIN UR ELPH-MCNC: 2.9 GM/DL
GLUCOSE SERPL-MCNC: 112 MG/DL (ref 65–99)
HCT VFR BLD AUTO: 43.4 % (ref 34–46.6)
HGB BLD-MCNC: 13.7 G/DL (ref 12–15.9)
IMM GRANULOCYTES # BLD AUTO: 0.01 10*3/MM3 (ref 0–0.05)
IMM GRANULOCYTES NFR BLD AUTO: 0.1 % (ref 0–0.5)
INR PPP: 0.93 (ref 0.91–1.09)
LYMPHOCYTES # BLD AUTO: 1.34 10*3/MM3 (ref 0.7–3.1)
LYMPHOCYTES NFR BLD AUTO: 18.8 % (ref 19.6–45.3)
MCH RBC QN AUTO: 29.7 PG (ref 26.6–33)
MCHC RBC AUTO-ENTMCNC: 31.6 G/DL (ref 31.5–35.7)
MCV RBC AUTO: 94.1 FL (ref 79–97)
MONOCYTES # BLD AUTO: 0.48 10*3/MM3 (ref 0.1–0.9)
MONOCYTES NFR BLD AUTO: 6.7 % (ref 5–12)
NEUTROPHILS NFR BLD AUTO: 5.16 10*3/MM3 (ref 1.7–7)
NEUTROPHILS NFR BLD AUTO: 72.3 % (ref 42.7–76)
NRBC BLD AUTO-RTO: 0 /100 WBC (ref 0–0.2)
PLATELET # BLD AUTO: 213 10*3/MM3 (ref 140–450)
PMV BLD AUTO: 11.5 FL (ref 6–12)
POTASSIUM SERPL-SCNC: 5.2 MMOL/L (ref 3.5–5.2)
PROT SERPL-MCNC: 7.5 G/DL (ref 6–8.5)
PROTHROMBIN TIME: 12.5 SECONDS (ref 11.8–14.8)
RBC # BLD AUTO: 4.61 10*6/MM3 (ref 3.77–5.28)
RH BLD: POSITIVE
SODIUM SERPL-SCNC: 144 MMOL/L (ref 136–145)
T&S EXPIRATION DATE: NORMAL
WBC NRBC COR # BLD: 7.14 10*3/MM3 (ref 3.4–10.8)

## 2023-10-23 PROCEDURE — 80053 COMPREHEN METABOLIC PANEL: CPT | Performed by: INTERNAL MEDICINE

## 2023-10-23 PROCEDURE — 85025 COMPLETE CBC W/AUTO DIFF WBC: CPT | Performed by: INTERNAL MEDICINE

## 2023-10-23 PROCEDURE — 86901 BLOOD TYPING SEROLOGIC RH(D): CPT | Performed by: INTERNAL MEDICINE

## 2023-10-23 PROCEDURE — 85610 PROTHROMBIN TIME: CPT | Performed by: INTERNAL MEDICINE

## 2023-10-23 PROCEDURE — 86850 RBC ANTIBODY SCREEN: CPT | Performed by: INTERNAL MEDICINE

## 2023-10-23 PROCEDURE — 71046 X-RAY EXAM CHEST 2 VIEWS: CPT

## 2023-10-23 PROCEDURE — 86900 BLOOD TYPING SEROLOGIC ABO: CPT | Performed by: INTERNAL MEDICINE

## 2023-10-23 PROCEDURE — 93005 ELECTROCARDIOGRAM TRACING: CPT | Performed by: INTERNAL MEDICINE

## 2023-10-23 NOTE — PROGRESS NOTES
Subjective    Ms. Pyle is 78 y.o. female    Chief Complaint: recurrent UTI    History of Present Illness    78-year-old female established patient in for 2-week follow-up to recheck urine as patient has continued to fitzgerald an ongoing infection.  Last urine culture revealed E. coli bacteria that surprisingly did not show ESBL however the culture prior to that showed Klebsiella ESBL for which patient had received a 7-day course of Invanz only within a couple days for symptoms to return.  This last treatment was with cefdinir.  Patient reports that she does not feel as though the medication has been working.  Is still experiencing the suprapubic discomfort as well as mid back pain.  Urinalysis again today showing positive for nitrites leukocytes.  Again suspicious for infection.    recent hospital encounter due to a fall with loss of consciousness with brain bleed where patient was found to have a urinary tract infection likely contributing to the fall.  Was treated with IV antibiotic therapy and later transition to cefdinir x10 days fall occurred August 28, 2023 was last flighted to [facility] in [place].  Remains on Myrbetriq for OAB.  Remains on 3 times weekly vaginal estrogen cream Premarin noting improvement and OTC probiotic and d-mannose.    The following portions of the patient's history were reviewed and updated as appropriate: allergies, current medications, past family history, past medical history, past social history, past surgical history and problem list.    Review of Systems   Constitutional:  Negative for chills and fever.   Gastrointestinal:  Negative for abdominal pain, anal bleeding and blood in stool.   Genitourinary:  Positive for pelvic pain. Negative for dysuria and hematuria.   Musculoskeletal:  Positive for back pain.         Current Outpatient Medications:     amiodarone (PACERONE) 200 MG tablet, Take 1 tablet by mouth Daily., Disp: , Rfl:     aspirin 81 MG EC tablet, Take 1  tablet by mouth Daily., Disp: 30 tablet, Rfl: 11    BIOTIN 5000 PO, Take  by mouth., Disp: , Rfl:     Cholecalciferol (VITAMIN D3) 50 MCG (2000 UT) capsule, Take 1 capsule by mouth Daily., Disp: , Rfl:     clopidogrel (PLAVIX) 75 MG tablet, Take 1 tablet by mouth Daily., Disp: 30 tablet, Rfl: 5    conjugated estrogens (Premarin) 0.625 MG/GM vaginal cream, Insert 0,5gm Monday, Wednesday and Friday, Disp: 1 each, Rfl: 11    Cranberry 1000 MG capsule, Take  by mouth., Disp: , Rfl:     dicyclomine (BENTYL) 10 MG capsule, Take 1 capsule by mouth As Needed., Disp: , Rfl:     dilTIAZem (CARDIZEM) 30 MG tablet, Take 1 tablet by mouth As Needed (HR sustained over 150 bpm)., Disp: 90 tablet, Rfl: 4    ferrous sulfate 324 (65 Fe) MG tablet delayed-release EC tablet, Take 1 tablet by mouth Daily With Breakfast., Disp: , Rfl:     Glucosamine 500 MG capsule, Take 1 capsule by mouth Daily., Disp: , Rfl:     lisinopril (PRINIVIL,ZESTRIL) 20 MG tablet, Take 1 tablet by mouth Daily., Disp: , Rfl:     Magnesium Gluconate (MAGNESIUM 27 PO), Take 1 tablet by mouth Daily., Disp: , Rfl:     Mirabegron ER (MYRBETRIQ) 50 MG tablet sustained-release 24 hour 24 hr tablet, Take 50 mg by mouth Daily., Disp: 90 tablet, Rfl: 3    Multiple Vitamins-Minerals (PRESERVISION AREDS 2 PO), Take 1 tablet by mouth Daily., Disp: , Rfl:     Multiple Vitamins-Minerals (ZINC PO), Take  by mouth., Disp: , Rfl:     Omega-3 Fatty Acids (Fish Oil Double Strength) 1200 MG capsule, Take 3,600 mg by mouth 2 (two) times a day., Disp: , Rfl:     Potassium 99 MG tablet, Take 1 tablet by mouth Daily., Disp: , Rfl:     vitamin B-12 (CYANOCOBALAMIN) 1000 MCG tablet, Take 5 tablets by mouth Daily., Disp: , Rfl:     vitamin E 400 UNIT capsule, Take 1 capsule by mouth Daily., Disp: , Rfl:     Zinc 50 MG tablet, Take 1 tablet by mouth Daily., Disp: , Rfl:     Past Medical History:   Diagnosis Date    A-fib     Hypertension     Urinary tract infection        Past Surgical  "History:   Procedure Laterality Date    APPENDECTOMY      ATRIAL APPENDAGE EXCLUSION LEFT WITH TRANSESOPHAGEAL ECHOCARDIOGRAM Right 10/24/2023    Procedure: Atrial Appendage Occlusion;  Surgeon: Ad Ahmadi MD;  Location:  [facility] CATH [facility]VAS[facility];  Service: Cardiology;  Laterality: Right;    BLADDER SURGERY      EYE SURGERY      FOOT FRACTURE SURGERY      HYSTERECTOMY         Social History     Socioeconomic History    Marital status:    Tobacco Use    Smoking status: Never    Smokeless tobacco: Never   Vaping Use    Vaping Use: Never used   Substance and Sexual Activity    Alcohol use: No    Drug use: No    Sexual activity: Defer       Family History   Problem Relation Age of Onset    Stroke Sister     Hypertension Sister     Stroke Mother     Hypertension Mother     Stroke Father     Hypertension Father     Arrhythmia Brother        Objective    Temp 97.8 °F (36.6 °C)   Ht 167.6 cm (65.98\")   Wt 69.1 kg (152 lb 6.4 oz)   BMI 24.61 kg/m²     Physical Exam  Nursing note reviewed.   Constitutional:       General: She is not in acute distress.     Appearance: Normal appearance. She is not ill-appearing.   HENT:      Nose: No congestion.   Abdominal:      Tenderness: There is abdominal tenderness in the suprapubic area. There is no right CVA tenderness or left CVA tenderness.      Hernia: No hernia is present.   Skin:     General: Skin is warm and dry.   Neurological:      Mental Status: She is alert and oriented to person, place, and time.   Psychiatric:         Mood and Affect: Mood normal.         Behavior: Behavior normal.             Results for orders placed or performed in visit on 10/30/23   POC Urinalysis Dipstick, Multipro    Specimen: Urine   Result Value Ref Range    Color Yellow Yellow, Straw, Dark Yellow, Opal    Clarity, UA Clear Clear    Glucose, UA Negative Negative mg/dL    Bilirubin Negative Negative    Ketones, UA Trace (A) Negative    Specific Gravity  1.020 1.005 - " 1.030    Blood, UA Trace (A) Negative    pH, Urine 7.0 5.0 - 8.0    Protein, POC Negative Negative mg/dL    Urobilinogen, UA 0.2 E.U./dL Normal, 0.2 E.U./dL    Nitrite, UA Positive (A) Negative    Leukocytes Small (1+) (A) Negative     Assessment and Plan    Diagnoses and all orders for this visit:    1. Recurrent UTI (Primary)  -     POC Urinalysis Dipstick, Multipro  -     Ambulatory Referral to Infectious Disease    Urine again suspicious for infection-at this point it seems patient may be chronically colonized with bacteria as we are unable to eradicate infection currently therefore recommend further evaluation through infectious disease to see if they can have any further input  Will again send urine for culture however this time we will send the resolve MDX urine culture testing.  We will wait to treat until culture result returns.     Patient to continue Myrbetriq 25 mg daily as well as vaginal estrogen cream 2-3 times weekly     We will have patient return in 2 weeks repeat UA

## 2023-10-24 ENCOUNTER — ANESTHESIA EVENT (OUTPATIENT)
Dept: CARDIOLOGY | Facility: HOSPITAL | Age: 78
End: 2023-10-24
Payer: MEDICARE

## 2023-10-24 ENCOUNTER — HOSPITAL ENCOUNTER (INPATIENT)
Facility: HOSPITAL | Age: 78
LOS: 1 days | Discharge: HOME | DRG: 274 | End: 2023-10-25
Attending: INTERNAL MEDICINE | Admitting: INTERNAL MEDICINE
Payer: MEDICARE

## 2023-10-24 ENCOUNTER — ANESTHESIA (OUTPATIENT)
Dept: CARDIOLOGY | Facility: HOSPITAL | Age: 78
End: 2023-10-24
Payer: MEDICARE

## 2023-10-24 ENCOUNTER — APPOINTMENT (OUTPATIENT)
Dept: CARDIOLOGY | Facility: HOSPITAL | Age: 78
DRG: 274 | End: 2023-10-24
Payer: MEDICARE

## 2023-10-24 DIAGNOSIS — I48.0 PAROXYSMAL ATRIAL FIBRILLATION: ICD-10-CM

## 2023-10-24 DIAGNOSIS — I48.0 PAF (PAROXYSMAL ATRIAL FIBRILLATION): ICD-10-CM

## 2023-10-24 LAB
QT INTERVAL: 434 MS
QTC INTERVAL: 461 MS

## 2023-10-24 PROCEDURE — 25010000002 HEPARIN (PORCINE) 1000-0.9 UT/500ML-% SOLUTION: Performed by: INTERNAL MEDICINE

## 2023-10-24 PROCEDURE — C1894 INTRO/SHEATH, NON-LASER: HCPCS | Performed by: INTERNAL MEDICINE

## 2023-10-24 PROCEDURE — 25010000002 HEPARIN (PORCINE) PER 1000 UNITS: Performed by: NURSE ANESTHETIST, CERTIFIED REGISTERED

## 2023-10-24 PROCEDURE — 25010000002 CEFAZOLIN PER 500 MG: Performed by: INTERNAL MEDICINE

## 2023-10-24 PROCEDURE — 25510000001 IOPAMIDOL PER 1 ML: Performed by: INTERNAL MEDICINE

## 2023-10-24 PROCEDURE — C1889 IMPLANT/INSERT DEVICE, NOC: HCPCS | Performed by: INTERNAL MEDICINE

## 2023-10-24 PROCEDURE — C1893 INTRO/SHEATH, FIXED,NON-PEEL: HCPCS | Performed by: INTERNAL MEDICINE

## 2023-10-24 PROCEDURE — 33340 PERQ CLSR TCAT L ATR APNDGE: CPT | Performed by: INTERNAL MEDICINE

## 2023-10-24 PROCEDURE — 25010000002 HEPARIN (PORCINE) 2000-0.9 UNIT/L-% SOLUTION: Performed by: INTERNAL MEDICINE

## 2023-10-24 PROCEDURE — 93355 ECHO TRANSESOPHAGEAL (TEE): CPT | Performed by: INTERNAL MEDICINE

## 2023-10-24 PROCEDURE — 85347 COAGULATION TIME ACTIVATED: CPT

## 2023-10-24 PROCEDURE — 02L73DK OCCLUSION OF LEFT ATRIAL APPENDAGE WITH INTRALUMINAL DEVICE, PERCUTANEOUS APPROACH: ICD-10-PCS | Performed by: INTERNAL MEDICINE

## 2023-10-24 PROCEDURE — 25010000002 ONDANSETRON PER 1 MG: Performed by: NURSE ANESTHETIST, CERTIFIED REGISTERED

## 2023-10-24 PROCEDURE — 25010000002 PROPOFOL 10 MG/ML EMULSION: Performed by: NURSE ANESTHETIST, CERTIFIED REGISTERED

## 2023-10-24 PROCEDURE — 25810000003 SODIUM CHLORIDE 0.9 % SOLUTION: Performed by: INTERNAL MEDICINE

## 2023-10-24 PROCEDURE — 25810000003 LACTATED RINGERS PER 1000 ML: Performed by: INTERNAL MEDICINE

## 2023-10-24 DEVICE — LEFT ATRIAL APPENDAGE CLOSURE DEVICE WITH DELIVERY SYSTEM
Type: IMPLANTABLE DEVICE | Site: HEART | Status: FUNCTIONAL
Brand: WATCHMAN FLX™

## 2023-10-24 RX ORDER — NEOSTIGMINE METHYLSULFATE 5 MG/5 ML
SYRINGE (ML) INTRAVENOUS AS NEEDED
Status: DISCONTINUED | OUTPATIENT
Start: 2023-10-24 | End: 2023-10-24 | Stop reason: SURG

## 2023-10-24 RX ORDER — HEPARIN SODIUM 200 [USP'U]/100ML
INJECTION, SOLUTION INTRAVENOUS
Status: DISCONTINUED | OUTPATIENT
Start: 2023-10-24 | End: 2023-10-24 | Stop reason: HOSPADM

## 2023-10-24 RX ORDER — ASPIRIN 81 MG/1
TABLET, CHEWABLE ORAL
Status: DISPENSED
Start: 2023-10-24 | End: 2023-10-25

## 2023-10-24 RX ORDER — LIDOCAINE HYDROCHLORIDE 20 MG/ML
INJECTION, SOLUTION INFILTRATION; PERINEURAL
Status: DISCONTINUED | OUTPATIENT
Start: 2023-10-24 | End: 2023-10-24 | Stop reason: HOSPADM

## 2023-10-24 RX ORDER — ONDANSETRON 2 MG/ML
INJECTION INTRAMUSCULAR; INTRAVENOUS AS NEEDED
Status: DISCONTINUED | OUTPATIENT
Start: 2023-10-24 | End: 2023-10-24 | Stop reason: SURG

## 2023-10-24 RX ORDER — HEPARIN SODIUM 1000 [USP'U]/ML
INJECTION, SOLUTION INTRAVENOUS; SUBCUTANEOUS AS NEEDED
Status: DISCONTINUED | OUTPATIENT
Start: 2023-10-24 | End: 2023-10-24 | Stop reason: SURG

## 2023-10-24 RX ORDER — ROCURONIUM BROMIDE 10 MG/ML
INJECTION, SOLUTION INTRAVENOUS AS NEEDED
Status: DISCONTINUED | OUTPATIENT
Start: 2023-10-24 | End: 2023-10-24 | Stop reason: SURG

## 2023-10-24 RX ORDER — SODIUM CHLORIDE 0.9 % (FLUSH) 0.9 %
10 SYRINGE (ML) INJECTION EVERY 12 HOURS SCHEDULED
Status: DISCONTINUED | OUTPATIENT
Start: 2023-10-24 | End: 2023-10-24

## 2023-10-24 RX ORDER — SODIUM CHLORIDE 0.9 % (FLUSH) 0.9 %
10 SYRINGE (ML) INJECTION AS NEEDED
Status: DISCONTINUED | OUTPATIENT
Start: 2023-10-24 | End: 2023-10-24

## 2023-10-24 RX ORDER — ASPIRIN 81 MG/1
324 TABLET, CHEWABLE ORAL ONCE
Status: COMPLETED | OUTPATIENT
Start: 2023-10-24 | End: 2023-10-24

## 2023-10-24 RX ORDER — AMIODARONE HYDROCHLORIDE 200 MG/1
200 TABLET ORAL DAILY
Status: DISCONTINUED | OUTPATIENT
Start: 2023-10-25 | End: 2023-10-25 | Stop reason: HOSPADM

## 2023-10-24 RX ORDER — OXYBUTYNIN CHLORIDE 5 MG/1
10 TABLET, EXTENDED RELEASE ORAL DAILY
Status: DISCONTINUED | OUTPATIENT
Start: 2023-10-25 | End: 2023-10-25 | Stop reason: HOSPADM

## 2023-10-24 RX ORDER — ACETAMINOPHEN 325 MG/1
650 TABLET ORAL EVERY 4 HOURS PRN
Status: DISCONTINUED | OUTPATIENT
Start: 2023-10-24 | End: 2023-10-25 | Stop reason: HOSPADM

## 2023-10-24 RX ORDER — SODIUM CHLORIDE 9 MG/ML
100 INJECTION, SOLUTION INTRAVENOUS CONTINUOUS
Status: DISPENSED | OUTPATIENT
Start: 2023-10-24 | End: 2023-10-24

## 2023-10-24 RX ORDER — NITROGLYCERIN 0.4 MG/1
0.4 TABLET SUBLINGUAL
Status: DISCONTINUED | OUTPATIENT
Start: 2023-10-24 | End: 2023-10-25 | Stop reason: HOSPADM

## 2023-10-24 RX ORDER — ONDANSETRON 4 MG/1
4 TABLET, FILM COATED ORAL EVERY 6 HOURS PRN
Status: DISCONTINUED | OUTPATIENT
Start: 2023-10-24 | End: 2023-10-25 | Stop reason: HOSPADM

## 2023-10-24 RX ORDER — LIDOCAINE HYDROCHLORIDE 20 MG/ML
INJECTION, SOLUTION EPIDURAL; INFILTRATION; INTRACAUDAL; PERINEURAL AS NEEDED
Status: DISCONTINUED | OUTPATIENT
Start: 2023-10-24 | End: 2023-10-24 | Stop reason: SURG

## 2023-10-24 RX ORDER — ONDANSETRON 2 MG/ML
4 INJECTION INTRAMUSCULAR; INTRAVENOUS EVERY 6 HOURS PRN
Status: DISCONTINUED | OUTPATIENT
Start: 2023-10-24 | End: 2023-10-25 | Stop reason: HOSPADM

## 2023-10-24 RX ORDER — PROPOFOL 10 MG/ML
VIAL (ML) INTRAVENOUS AS NEEDED
Status: DISCONTINUED | OUTPATIENT
Start: 2023-10-24 | End: 2023-10-24 | Stop reason: SURG

## 2023-10-24 RX ORDER — SODIUM CHLORIDE, SODIUM LACTATE, POTASSIUM CHLORIDE, CALCIUM CHLORIDE 600; 310; 30; 20 MG/100ML; MG/100ML; MG/100ML; MG/100ML
1 INJECTION, SOLUTION INTRAVENOUS CONTINUOUS
Status: DISCONTINUED | OUTPATIENT
Start: 2023-10-24 | End: 2023-10-25

## 2023-10-24 RX ORDER — LISINOPRIL 20 MG/1
20 TABLET ORAL DAILY
Status: DISCONTINUED | OUTPATIENT
Start: 2023-10-25 | End: 2023-10-25 | Stop reason: HOSPADM

## 2023-10-24 RX ORDER — ASPIRIN 81 MG/1
81 TABLET ORAL DAILY
Status: DISCONTINUED | OUTPATIENT
Start: 2023-10-25 | End: 2023-10-25 | Stop reason: HOSPADM

## 2023-10-24 RX ORDER — VITAMIN E 268 MG
400 CAPSULE ORAL DAILY
Status: DISCONTINUED | OUTPATIENT
Start: 2023-10-25 | End: 2023-10-25 | Stop reason: HOSPADM

## 2023-10-24 RX ADMIN — HEPARIN SODIUM 4000 UNITS: 1000 INJECTION, SOLUTION INTRAVENOUS; SUBCUTANEOUS at 14:26

## 2023-10-24 RX ADMIN — SODIUM CHLORIDE 100 ML/HR: 9 INJECTION, SOLUTION INTRAVENOUS at 16:57

## 2023-10-24 RX ADMIN — Medication 3 MG: at 15:11

## 2023-10-24 RX ADMIN — ROCURONIUM BROMIDE 25 MG: 10 SOLUTION INTRAVENOUS at 13:56

## 2023-10-24 RX ADMIN — SODIUM CHLORIDE, POTASSIUM CHLORIDE, SODIUM LACTATE AND CALCIUM CHLORIDE 1 ML/KG/HR: 600; 310; 30; 20 INJECTION, SOLUTION INTRAVENOUS at 12:47

## 2023-10-24 RX ADMIN — HEPARIN SODIUM 4000 UNITS: 1000 INJECTION, SOLUTION INTRAVENOUS; SUBCUTANEOUS at 14:22

## 2023-10-24 RX ADMIN — CEFAZOLIN 2000 MG: 2 INJECTION, POWDER, FOR SOLUTION INTRAMUSCULAR; INTRAVENOUS at 14:01

## 2023-10-24 RX ADMIN — PHENOL 1 SPRAY: 1.4 SPRAY ORAL at 20:34

## 2023-10-24 RX ADMIN — PROPOFOL 100 MG: 10 INJECTION, EMULSION INTRAVENOUS at 13:56

## 2023-10-24 RX ADMIN — ASPIRIN 324 MG: 81 TABLET, CHEWABLE ORAL at 12:47

## 2023-10-24 RX ADMIN — ONDANSETRON 4 MG: 2 INJECTION INTRAMUSCULAR; INTRAVENOUS at 15:00

## 2023-10-24 RX ADMIN — PHENOL 1 SPRAY: 1.4 SPRAY ORAL at 23:41

## 2023-10-24 RX ADMIN — ACETAMINOPHEN 650 MG: 325 TABLET, FILM COATED ORAL at 21:37

## 2023-10-24 RX ADMIN — LIDOCAINE HYDROCHLORIDE 100 MG: 20 INJECTION, SOLUTION EPIDURAL; INFILTRATION; INTRACAUDAL; PERINEURAL at 13:56

## 2023-10-24 RX ADMIN — GLYCOPYRROLATE 0.4 MG: 0.2 INJECTION INTRAMUSCULAR; INTRAVENOUS at 15:11

## 2023-10-24 NOTE — PLAN OF CARE
Problem: Adult Inpatient Plan of Care  Goal: Plan of Care Review  Outcome: Ongoing, Progressing  Goal: Patient-Specific Goal (Individualized)  Outcome: Ongoing, Progressing  Goal: Absence of Hospital-Acquired Illness or Injury  Outcome: Ongoing, Progressing  Intervention: Identify and Manage Fall Risk  Recent Flowsheet Documentation  Taken 10/24/2023 1704 by Brandt Wan RN  Safety Promotion/Fall Prevention: safety round/check completed  Taken 10/24/2023 1644 by Brandt Wan RN  Safety Promotion/Fall Prevention: safety round/check completed  Intervention: Prevent Skin Injury  Recent Flowsheet Documentation  Taken 10/24/2023 1704 by Brandt Wan RN  Body Position: supine  Taken 10/24/2023 1644 by Brandt Wan RN  Body Position: supine  Intervention: Prevent and Manage VTE (Venous Thromboembolism) Risk  Recent Flowsheet Documentation  Taken 10/24/2023 1644 by Brandt Wan RN  Activity Management: bedrest  VTE Prevention/Management: (xarelto) other (see comments)  Goal: Optimal Comfort and Wellbeing  Outcome: Ongoing, Progressing  Intervention: Provide Person-Centered Care  Recent Flowsheet Documentation  Taken 10/24/2023 1644 by Brandt Wan RN  Trust Relationship/Rapport: care explained  Goal: Readiness for Transition of Care  Outcome: Ongoing, Progressing  Intervention: Mutually Develop Transition Plan  Recent Flowsheet Documentation  Taken 10/24/2023 1700 by Brandt Wan RN  Equipment Currently Used at Home: none  Transportation Anticipated: car, drives self  Patient/Family Anticipated Services at Transition: none  Patient/Family Anticipates Transition to: home with family     Problem: Chest Pain  Goal: Resolution of Chest Pain Symptoms  Outcome: Ongoing, Progressing     Problem: Hypertension Comorbidity  Goal: Blood Pressure in Desired Range  Outcome: Ongoing, Progressing  Intervention: Maintain Blood Pressure Management  Recent Flowsheet Documentation  Taken 10/24/2023 1644 by Brandt Wan  RN  Medication Review/Management: medications reviewed     Problem: Skin Injury Risk Increased  Goal: Skin Health and Integrity  Outcome: Ongoing, Progressing  Intervention: Optimize Skin Protection  Recent Flowsheet Documentation  Taken 10/24/2023 1644 by Brandt Wan, RN  Head of Bed (HOB) Positioning: HOB flat   Goal Outcome Evaluation:

## 2023-10-24 NOTE — ANESTHESIA PROCEDURE NOTES
Airway  Urgency: elective    Date/Time: 10/24/2023 1:59 PM  Airway not difficult    General Information and Staff    Patient location during procedure: OR  CRNA/CAA: Davida Maki CRNA    Indications and Patient Condition  Indications for airway management: airway protection    Preoxygenated: yes  Mask difficulty assessment: 1 - vent by mask    Final Airway Details  Final airway type: endotracheal airway      Successful airway: ETT  Cuffed: yes   Successful intubation technique: direct laryngoscopy  Facilitating devices/methods: intubating stylet  Endotracheal tube insertion site: oral  Blade: Hailey  Blade size: 3.5  ETT size (mm): 7.0  Cormack-Lehane Classification: grade I - full view of glottis  Placement verified by: chest auscultation and capnometry   Cuff volume (mL): 5  Measured from: lips  ETT/EBT  to lips (cm): 21  Number of attempts at approach: 1  Assessment: lips, teeth, and gum same as pre-op and atraumatic intubation

## 2023-10-24 NOTE — ANESTHESIA POSTPROCEDURE EVALUATION
"Patient: Vane Pyle    Procedure Summary       Date: 10/24/23 Room / Location: [facility] CATH LAB 2 /  [facility]ATH [facility]AS[facility] LOCATION    Anesthesia Start: 1351 Anesthesia Stop: 1523    Procedure: Atrial Appendage Occlusion (Right) Diagnosis: PAF (paroxysmal atrial fibrillation)    Providers: Ad Ahmadi MD Provider: Davida Maki CRNA    Anesthesia Type: general ASA Status: 2            Anesthesia Type: general    Vitals  Vitals Value Taken Time   /69 10/24/23 1631   Temp 97.3 °F (36.3 °C) 10/24/23 1631   Pulse 62 10/24/23 1631   Resp 18 10/24/23 1631   SpO2 99 % 10/24/23 1631           Post Anesthesia Care and Evaluation    Patient location during evaluation: PACU  Patient participation: complete - patient participated  Level of consciousness: awake and alert  Pain management: adequate    Airway patency: patent  Anesthetic complications: No anesthetic complications    Cardiovascular status: acceptable  Respiratory status: acceptable  Hydration status: acceptable    Comments: Blood pressure 152/69, pulse 62, temperature 97.3 °F (36.3 °C), temperature source Oral, resp. rate 18, height 167.6 cm (66\"), weight 69.1 kg (152 lb 4.8 oz), SpO2 99%.    Pt discharged from PACU based on yovanny score >8  No anesthesia care post op    "

## 2023-10-24 NOTE — ANESTHESIA PREPROCEDURE EVALUATION
Anesthesia Evaluation     no history of anesthetic complications:   NPO Solid Status: > 8 hours  NPO Liquid Status: > 8 hours           Airway   Mallampati: II  TM distance: >3 FB  Dental - normal exam     Pulmonary - negative pulmonary ROS   Cardiovascular     ECG reviewed    (+) hypertension, dysrhythmias Paroxysmal Atrial Fib    ROS comment: Echo 10/2023 Left ventricular systolic function is normal.  ·  Normal right ventricular cavity size and systolic function noted.  ·  No significant valvular abnormalities.  ·  No evidence of left atrial appendage thrombus.      Neuro/Psych- negative ROS  GI/Hepatic/Renal/Endo      Musculoskeletal     Abdominal    Substance History      OB/GYN          Other                      Anesthesia Plan    ASA 2     general     intravenous induction     Anesthetic plan, risks, benefits, and alternatives have been provided, discussed and informed consent has been obtained with: patient.      CODE STATUS:

## 2023-10-25 VITALS
BODY MASS INDEX: 24.48 KG/M2 | HEART RATE: 70 BPM | HEIGHT: 66 IN | DIASTOLIC BLOOD PRESSURE: 60 MMHG | WEIGHT: 152.3 LBS | RESPIRATION RATE: 18 BRPM | SYSTOLIC BLOOD PRESSURE: 131 MMHG | OXYGEN SATURATION: 96 % | TEMPERATURE: 98.4 F

## 2023-10-25 LAB
ACT BLD: 269 SECONDS (ref 82–152)
ANION GAP SERPL CALCULATED.3IONS-SCNC: 9 MMOL/L (ref 5–15)
BUN SERPL-MCNC: 12 MG/DL (ref 8–23)
BUN/CREAT SERPL: 16.7 (ref 7–25)
CALCIUM SPEC-SCNC: 8.3 MG/DL (ref 8.6–10.5)
CHLORIDE SERPL-SCNC: 108 MMOL/L (ref 98–107)
CO2 SERPL-SCNC: 24 MMOL/L (ref 22–29)
CREAT SERPL-MCNC: 0.72 MG/DL (ref 0.57–1)
DEPRECATED RDW RBC AUTO: 47.7 FL (ref 37–54)
EGFRCR SERPLBLD CKD-EPI 2021: 86.2 ML/MIN/1.73
ERYTHROCYTE [DISTWIDTH] IN BLOOD BY AUTOMATED COUNT: 13.8 % (ref 12.3–15.4)
GLUCOSE BLDC GLUCOMTR-MCNC: 112 MG/DL (ref 70–130)
GLUCOSE SERPL-MCNC: 112 MG/DL (ref 65–99)
HBA1C MFR BLD: 5.4 % (ref 4.8–5.6)
HCT VFR BLD AUTO: 37 % (ref 34–46.6)
HGB BLD-MCNC: 11.8 G/DL (ref 12–15.9)
MCH RBC QN AUTO: 30.1 PG (ref 26.6–33)
MCHC RBC AUTO-ENTMCNC: 31.9 G/DL (ref 31.5–35.7)
MCV RBC AUTO: 94.4 FL (ref 79–97)
PLATELET # BLD AUTO: 167 10*3/MM3 (ref 140–450)
PMV BLD AUTO: 11.5 FL (ref 6–12)
POTASSIUM SERPL-SCNC: 3.8 MMOL/L (ref 3.5–5.2)
RBC # BLD AUTO: 3.92 10*6/MM3 (ref 3.77–5.28)
SODIUM SERPL-SCNC: 141 MMOL/L (ref 136–145)
WBC NRBC COR # BLD: 9.21 10*3/MM3 (ref 3.4–10.8)

## 2023-10-25 PROCEDURE — 80048 BASIC METABOLIC PNL TOTAL CA: CPT | Performed by: INTERNAL MEDICINE

## 2023-10-25 PROCEDURE — 83036 HEMOGLOBIN GLYCOSYLATED A1C: CPT | Performed by: INTERNAL MEDICINE

## 2023-10-25 PROCEDURE — 82948 REAGENT STRIP/BLOOD GLUCOSE: CPT

## 2023-10-25 PROCEDURE — 85027 COMPLETE CBC AUTOMATED: CPT | Performed by: INTERNAL MEDICINE

## 2023-10-25 RX ORDER — CLOPIDOGREL BISULFATE 75 MG/1
75 TABLET ORAL DAILY
Qty: 30 TABLET | Refills: 5 | Status: SHIPPED | OUTPATIENT
Start: 2023-10-25

## 2023-10-25 RX ORDER — CLOPIDOGREL BISULFATE 75 MG/1
75 TABLET ORAL DAILY
Status: DISCONTINUED | OUTPATIENT
Start: 2023-10-25 | End: 2023-10-25 | Stop reason: HOSPADM

## 2023-10-25 RX ORDER — SODIUM CHLORIDE 0.9 % (FLUSH) 0.9 %
10 SYRINGE (ML) INJECTION EVERY 12 HOURS SCHEDULED
Status: DISCONTINUED | OUTPATIENT
Start: 2023-10-25 | End: 2023-10-25 | Stop reason: HOSPADM

## 2023-10-25 RX ORDER — SODIUM CHLORIDE 0.9 % (FLUSH) 0.9 %
10 SYRINGE (ML) INJECTION AS NEEDED
Status: DISCONTINUED | OUTPATIENT
Start: 2023-10-25 | End: 2023-10-25 | Stop reason: HOSPADM

## 2023-10-25 RX ORDER — SODIUM CHLORIDE 9 MG/ML
40 INJECTION, SOLUTION INTRAVENOUS AS NEEDED
Status: DISCONTINUED | OUTPATIENT
Start: 2023-10-25 | End: 2023-10-25 | Stop reason: HOSPADM

## 2023-10-25 RX ORDER — ASPIRIN 81 MG/1
81 TABLET ORAL DAILY
Qty: 30 TABLET | Refills: 11 | Status: SHIPPED | OUTPATIENT
Start: 2023-10-25

## 2023-10-25 RX ORDER — SODIUM CHLORIDE 9 MG/ML
100 INJECTION, SOLUTION INTRAVENOUS CONTINUOUS
Status: DISCONTINUED | OUTPATIENT
Start: 2023-10-25 | End: 2023-10-25

## 2023-10-25 RX ADMIN — AMIODARONE HYDROCHLORIDE 200 MG: 200 TABLET ORAL at 09:33

## 2023-10-25 RX ADMIN — ASPIRIN 81 MG: 81 TABLET, COATED ORAL at 09:35

## 2023-10-25 RX ADMIN — PHENOL 1 SPRAY: 1.4 SPRAY ORAL at 01:46

## 2023-10-25 RX ADMIN — PHENOL 1 SPRAY: 1.4 SPRAY ORAL at 05:04

## 2023-10-25 RX ADMIN — Medication 400 UNITS: at 09:33

## 2023-10-25 RX ADMIN — OXYBUTYNIN CHLORIDE 10 MG: 5 TABLET, EXTENDED RELEASE ORAL at 09:33

## 2023-10-25 RX ADMIN — LISINOPRIL 20 MG: 20 TABLET ORAL at 09:33

## 2023-10-25 NOTE — PLAN OF CARE
Problem: Adult Inpatient Plan of Care  Goal: Plan of Care Review  Outcome: Ongoing, Progressing  Flowsheets (Taken 10/25/2023 0441)  Progress: improving  Plan of Care Reviewed With: patient  Outcome Evaluation: A/O, VSS, up ad elizabeth. Pt c/o pain, prn pain medication given with relief. Pt c/o sore throat, prn chloraseptic spray given with relief. Pedal pulse palpable, watchman site soft, nontender, no drainage. IV fluids stopped during shift. Safety maintained, call light within reach. SA 60-70 on tele.

## 2023-10-25 NOTE — DISCHARGE SUMMARY
"Date of Admission: 10/24/23  Date of Discharge: 10/25/23    Admission Diagnosis:  1. Atrial fibrillation  2.  Prior intracranial hemorrhage    Discharge Diagnosis:  1. Same    Consults: None    Procedures:  1. Watchman left atrial appendage occlusion device placement (27 mm FLX).  2. Transesophageal echocardiography (during procedure)  3. General endotracheal anesthesia    HPI: This patient presented electively for placement of Watchman left atrial appendage occlusion device.  The patient previously experienced intracranial hemorrhage after a fall and desired an alternative to long-term anticoagulation for stroke risk reduction.    Hospital Course: The patient presented on the day of admission, underwent the procedure in the cardiac cath lab.  After the procedure, the patient was transitioned to the telemetry unit for overnight care.  The patient did well overnight with no immediate complications.  The patient's access site was examined on the day of discharge and found to be stable.  The patient was examined on the day of discharge and thought to be stable for discharge.    Discharge Day Exam:    S: No acute events overnight.  The patient has been ambulatory with no difficulty.  No problems with access site overnight.    O:  /60 (BP Location: Left arm, Patient Position: Sitting)   Pulse 70   Temp 98.4 °F (36.9 °C) (Oral)   Resp 18   Ht 167.6 cm (66\")   Wt 69.1 kg (152 lb 4.8 oz)   SpO2 96%   BMI 24.58 kg/m²     Gen: NAD  CV: RRR  Pulm: CTAB  GI: s, nt, nd, +bs  Ext: no edema, groin access clean, dry, intact, bandage and sutures still in place at this time    Discharge Day Labs:    Lab Results   Component Value Date    WBC 9.21 10/25/2023    HGB 11.8 (L) 10/25/2023    HCT 37.0 10/25/2023    MCV 94.4 10/25/2023     10/25/2023     Lab Results   Component Value Date    GLUCOSE 112 (H) 10/25/2023    CALCIUM 8.3 (L) 10/25/2023     10/25/2023    K 3.8 10/25/2023    CO2 24.0 10/25/2023     " (H) 10/25/2023    BUN 12 10/25/2023    CREATININE 0.72 10/25/2023    EGFR 86.2 10/25/2023    BCR 16.7 10/25/2023    ANIONGAP 9.0 10/25/2023     Discharge Medications:       Discharge Medications        New Medications        Instructions Start Date   aspirin 81 MG EC tablet   81 mg, Oral, Daily      clopidogrel 75 MG tablet  Commonly known as: PLAVIX   75 mg, Oral, Daily             Continue These Medications        Instructions Start Date   amiodarone 200 MG tablet  Commonly known as: PACERONE   200 mg, Oral, Daily      BIOTIN 5000 PO   Oral      Cranberry 1000 MG capsule   Oral      dicyclomine 10 MG capsule  Commonly known as: BENTYL   10 mg, Oral, As Needed      dilTIAZem 30 MG tablet  Commonly known as: CARDIZEM   30 mg, Oral, As Needed      ferrous sulfate 324 (65 Fe) MG tablet delayed-release EC tablet   324 mg, Oral, Daily With Breakfast      Fish Oil Double Strength 1200 MG capsule   3,600 mg, Oral, 2 times daily      Glucosamine 500 MG capsule   1 capsule, Oral, Daily      lisinopril 20 MG tablet  Commonly known as: PRINIVIL,ZESTRIL   20 mg, Oral, Daily      MAGNESIUM 27 PO   1 tablet, Oral, Daily      Mirabegron ER 50 MG tablet sustained-release 24 hour 24 hr tablet  Commonly known as: MYRBETRIQ   50 mg, Oral, Daily      Potassium 99 MG tablet   1 tablet, Oral, Daily      Premarin 0.625 MG/GM vaginal cream  Generic drug: Estrogens Conjugated   Insert 0,5gm Monday, Wednesday and Friday      PRESERVISION AREDS 2 PO   1 tablet, Oral, Daily      vitamin B-12 1000 MCG tablet  Commonly known as: CYANOCOBALAMIN   5,000 mcg, Oral, Daily      Vitamin D3 50 MCG (2000 UT) capsule   2,000 Units, Oral, Daily      vitamin E 400 UNIT capsule   400 Units, Oral, Daily      Zinc 50 MG tablet   1 tablet, Oral, Daily      ZINC PO   Oral             Stop These Medications      rivaroxaban 20 MG tablet  Commonly known as: XARELTO            Discharge Instructions: The patient was instructed on care for the access site.  In  addition, the patient was instructed about changes to medications, including aspirin and Plavix therapies and discontinuation of Xarelto.  The patient was also instructed on follow-up appointments.  Instructions were also given to avoid heavy lifting ×1 week.    Follow-up care: The patient will have a transesophageal echocardiogram scheduled at roughly 45 days.  The patient was also instructed to follow-up with her primary care provider in approximately one week.    Disposition: Home with family in stable condition.

## 2023-10-26 NOTE — PAYOR COMM NOTE
"REF:   [id number]     [facility]  FAX  [phone]     Tena Mario (78 y.o. Female)       Date of Birth   1945    Social Security Number       Address   [address] [place], [place] [address]    Home Phone   [phone]    MRN   [id number]       Mu-ism   [facility]    Marital Status                               Admission Date   10/24/23    Admission Type   Elective    Admitting Provider   Ad Ahmadi MD    Attending Provider       Department, Room/Bed   [facility] 4B, 445/1       Discharge Date   10/25/2023    Discharge Disposition   Home or Self Care    Discharge Destination                                 Attending Provider: (none)   Allergies: Latex, Sulfa Antibiotics    Isolation: None   Infection: ESBL Klebsiella (09/23/23)   Code Status: Prior    Ht: 167.6 cm (66\")   Wt: 69.1 kg (152 lb 4.8 oz)    Admission Cmt: None   Principal Problem: PAF (paroxysmal atrial fibrillation) [I48.0]                   Active Insurance as of 10/24/2023       Primary Coverage       Payor Plan Insurance Group Employer/Plan Group    [facility] MEDICARE REPLACEMENT [facility] MEDICARE REPLACEMENT [id number]       Payor Plan Address Payor Plan Phone Number Payor Plan Fax Number Effective Dates    [address] [address]   1/1/2017 - None Entered    [facility] [id number]         Subscriber Name Subscriber Birth Date Member ID       TENA MARIO 1945 [id number]                     Emergency Contacts        (Rel.) Home Phone Work Phone Mobile Phone    Efrain Mario (Spouse) -- -- [phone]          Kaylyn Cuba, RN   Registered Nurse     Plan of Care      Addendum     Date of Service: 10/25/23 0443  Creation Time: 10/25/23 0443          Problem: Adult Inpatient Plan of Care  Goal: Plan of Care Review  Outcome: Ongoing, Progressing  Flowsheets (Taken 10/25/2023 0441)  Progress: improving  Plan of Care Reviewed With: patient  Outcome Evaluation: A/O, VSS, " up ad elizabeth. Pt c/o pain, prn pain medication given with relief. Pt c/o sore throat, prn chloraseptic spray given with relief. Pedal pulse palpable, watchman site soft, nontender, no drainage. IV fluids stopped during shift. Safety maintained, call light within reach. SA 60-70 on tele.                History & Physical        Ad Ahmadi MD at 10/24/23 1319            H&P reviewed. The patient was examined and there are no changes to the H&P.   The Watchman procedure was discussed in detail with the patient, including the risks (including bleeding, infection, vascular damage [including minor oozing, bruising, bleeding, pericardial effusion [and potential need for drainage] and up to and including the need for vascular or CT surgery], contrast reaction, renal failure, respiratory failure, heart attack, stroke, arrhythmia and even death), benefits, and alternatives and the patient has voiced understanding and is willing to proceed.          Electronically signed by Ad Ahmadi MD at 10/24/23 1319   Source Note            Reason for Visit: Left atrial appendage closure shared decision making.     HPI:  Vane Pyle is a 78 y.o. female is here today for Left atrial appendage closure shared decision making.  She follows in cardiology clinic with Dr. Ahmadi for paroxysmal atrial fibrillation.  She recently fell at the end of August and hit her head, with subsequent development of a subdural hematoma.  She passed out prior to falling while she was talking on the phone.  She is therefore felt to be a poor candidate for chronic anticoagulation and is being worked up for alternative means of thromboembolic prophylaxis with left atrial appendage occlusion with the Watchman device.  She had CASS done on 10/11/2023 showing no evidence of left atrial appendage thrombus.        Patient Active Problem List   Diagnosis    A-fib    Hypertension    Current use of long term anticoagulation    UTI (urinary tract  infection)     Social History     Tobacco Use    Smoking status: Never    Smokeless tobacco: Never   Vaping Use    Vaping Use: Never used   Substance Use Topics    Alcohol use: No    Drug use: No     Family History   Problem Relation Age of Onset    Stroke Sister     Hypertension Sister     Stroke Mother     Hypertension Mother     Stroke Father     Hypertension Father     Arrhythmia Brother      The following portions of the patient's history were reviewed and updated as appropriate: allergies, current medications, past family history, past medical history, past social history, past surgical history, and problem list.      Current Outpatient Medications:     amiodarone (PACERONE) 200 MG tablet, Take 1 tablet by mouth Daily., Disp: , Rfl:     BIOTIN 5000 PO, Take  by mouth., Disp: , Rfl:     Calcium Carbonate-Vitamin D 600-200 MG-UNIT tablet, Calcium 600 with Vitamin D3 600 mg (1,500 mg)-200 unit tablet  Take 1 tablet every day by oral route., Disp: , Rfl:     cefdinir (OMNICEF) 300 MG capsule, Take 1 capsule by mouth 2 (Two) Times a Day., Disp: 20 capsule, Rfl: 0    Cholecalciferol (VITAMIN D3) 50 MCG (2000 UT) capsule, Take 1 capsule by mouth Daily., Disp: , Rfl:     conjugated estrogens (Premarin) 0.625 MG/GM vaginal cream, Insert 0,5gm Monday, Wednesday and Friday, Disp: 1 each, Rfl: 11    Cranberry 1000 MG capsule, Take  by mouth., Disp: , Rfl:     dicyclomine (BENTYL) 10 MG capsule, Take 1 capsule by mouth As Needed., Disp: , Rfl:     dilTIAZem (CARDIZEM) 30 MG tablet, Take 1 tablet by mouth As Needed (HR sustained over 150 bpm)., Disp: 90 tablet, Rfl: 4    ferrous sulfate 324 (65 Fe) MG tablet delayed-release EC tablet, Take 1 tablet by mouth Daily With Breakfast., Disp: , Rfl:     Glucosamine 500 MG capsule, Take  by mouth., Disp: , Rfl:     lisinopril (PRINIVIL,ZESTRIL) 20 MG tablet, Take 1 tablet by mouth Daily., Disp: , Rfl:     Magnesium Gluconate (MAGNESIUM 27 PO), Take  by mouth., Disp: , Rfl:      "Mirabegron ER (MYRBETRIQ) 50 MG tablet sustained-release 24 hour 24 hr tablet, Take 50 mg by mouth Daily., Disp: 90 tablet, Rfl: 3    Multiple Vitamins-Minerals (PRESERVISION AREDS 2 PO), Take 1 tablet by mouth Daily., Disp: , Rfl:     Multiple Vitamins-Minerals (ZINC PO), Take  by mouth., Disp: , Rfl:     Omega-3 Fatty Acids (Fish Oil Double Strength) 1200 MG capsule, Take 3,600 mg by mouth 2 (two) times a day., Disp: , Rfl:     Potassium 99 MG tablet, Take  by mouth., Disp: , Rfl:     rivaroxaban (XARELTO) 20 MG tablet, Take 1 tablet by mouth Daily., Disp: , Rfl:     vitamin B-12 (CYANOCOBALAMIN) 1000 MCG tablet, Take 5 tablets by mouth Daily., Disp: , Rfl:     vitamin E 400 UNIT capsule, Take 1 capsule by mouth Daily., Disp: , Rfl:     Zinc 50 MG tablet, Take 1 tablet by mouth Daily., Disp: , Rfl:     Review of Systems   Constitutional: Negative for chills and fever.   Cardiovascular:  Positive for syncope. Negative for chest pain, irregular heartbeat and paroxysmal nocturnal dyspnea.   Respiratory:  Negative for cough and shortness of breath.    Skin:  Negative for rash.   Musculoskeletal:  Positive for falls.   Gastrointestinal:  Negative for abdominal pain and heartburn.   Neurological:  Negative for dizziness and numbness.       Objective  /80 (BP Location: Left arm, Patient Position: Sitting, Cuff Size: Adult)   Pulse 68   Ht 167.6 cm (65.98\")   Wt 68.5 kg (151 lb)   SpO2 98%   BMI 24.38 kg/m²   Constitutional:       Appearance: Well-developed.   HENT:      Head: Normocephalic and atraumatic.   Pulmonary:      Effort: Pulmonary effort is normal.      Breath sounds: Normal breath sounds.   Cardiovascular:      Normal rate. Irregularly irregular rhythm.      Murmurs: There is no murmur.      No gallop.  No click.   Edema:     Peripheral edema absent.   Skin:     General: Skin is warm and dry.   Neurological:      Mental Status: Alert and oriented to person, place, and time. "       Procedures  CHADS-VASc Risk Assessment              4 Total Score    1 Hypertension    2 Age >/= 75    1 Sex: Female        Criteria that do not apply:    CHF    DM    PRIOR STROKE/TIA/THROMBO    Vascular Disease    Age 65-74             ICD-10-CM ICD-9-CM   1. Paroxysmal atrial fibrillation  I48.0 427.31   2. Current use of long term anticoagulation  Z79.01 V58.61   3. Primary hypertension  I10 401.9   4. History of subdural hematoma  Z86.79 V12.59       Assessment/Plan:  Based on the history, it has been determined that the patient is a poor candidate for long-term oral anticoagulation.  The patient may, however, be tolerant to short-term anticoagulation as necessary.    Specifically regarding anticoagulation they have demonstrated: fall, Subdural hematoma    We have discussed that you need stroke and bleeding risk on and off anticoagulation.  The individual [id number] stroke risk store is 4 (hypertension, age > 75, female sex), indicating an adjusted stroke rate of 4%/year.    Based on both stroke and bleeding risk, shared decision has been made to pursue closure of the left atrial appendage is a safe, effective alternative to long-term oral anticoagulation therapy for stroke prophylaxis.  This will reduce her long-term risk of incidence of bleeding.  Information regarding left atrial appendage closure and shared decision making were provided to patient.      Electronically signed by Lucas Bridges MD at 10/16/23 1531                 Lucas Bridges MD at 10/16/23 1500            Reason for Visit: Left atrial appendage closure shared decision making.     HPI:  Vane Pyle is a 78 y.o. female is here today for Left atrial appendage closure shared decision making.  She follows in cardiology clinic with Dr. Ahmadi for paroxysmal atrial fibrillation.  She recently fell at the end of August and hit her head, with subsequent development of a subdural hematoma.  She passed out prior to falling while she  was talking on the phone.  She is therefore felt to be a poor candidate for chronic anticoagulation and is being worked up for alternative means of thromboembolic prophylaxis with left atrial appendage occlusion with the Watchman device.  She had CASS done on 10/11/2023 showing no evidence of left atrial appendage thrombus.        Patient Active Problem List   Diagnosis    A-fib    Hypertension    Current use of long term anticoagulation    UTI (urinary tract infection)       Social History     Tobacco Use    Smoking status: Never    Smokeless tobacco: Never   Vaping Use    Vaping Use: Never used   Substance Use Topics    Alcohol use: No    Drug use: No       Family History   Problem Relation Age of Onset    Stroke Sister     Hypertension Sister     Stroke Mother     Hypertension Mother     Stroke Father     Hypertension Father     Arrhythmia Brother        The following portions of the patient's history were reviewed and updated as appropriate: allergies, current medications, past family history, past medical history, past social history, past surgical history, and problem list.      Current Outpatient Medications:     amiodarone (PACERONE) 200 MG tablet, Take 1 tablet by mouth Daily., Disp: , Rfl:     BIOTIN 5000 PO, Take  by mouth., Disp: , Rfl:     Calcium Carbonate-Vitamin D 600-200 MG-UNIT tablet, Calcium 600 with Vitamin D3 600 mg (1,500 mg)-200 unit tablet  Take 1 tablet every day by oral route., Disp: , Rfl:     cefdinir (OMNICEF) 300 MG capsule, Take 1 capsule by mouth 2 (Two) Times a Day., Disp: 20 capsule, Rfl: 0    Cholecalciferol (VITAMIN D3) 50 MCG (2000 UT) capsule, Take 1 capsule by mouth Daily., Disp: , Rfl:     conjugated estrogens (Premarin) 0.625 MG/GM vaginal cream, Insert 0,5gm Monday, Wednesday and Friday, Disp: 1 each, Rfl: 11    Cranberry 1000 MG capsule, Take  by mouth., Disp: , Rfl:     dicyclomine (BENTYL) 10 MG capsule, Take 1 capsule by mouth As Needed., Disp: , Rfl:     dilTIAZem  "(CARDIZEM) 30 MG tablet, Take 1 tablet by mouth As Needed (HR sustained over 150 bpm)., Disp: 90 tablet, Rfl: 4    ferrous sulfate 324 (65 Fe) MG tablet delayed-release EC tablet, Take 1 tablet by mouth Daily With Breakfast., Disp: , Rfl:     Glucosamine 500 MG capsule, Take  by mouth., Disp: , Rfl:     lisinopril (PRINIVIL,ZESTRIL) 20 MG tablet, Take 1 tablet by mouth Daily., Disp: , Rfl:     Magnesium Gluconate (MAGNESIUM 27 PO), Take  by mouth., Disp: , Rfl:     Mirabegron ER (MYRBETRIQ) 50 MG tablet sustained-release 24 hour 24 hr tablet, Take 50 mg by mouth Daily., Disp: 90 tablet, Rfl: 3    Multiple Vitamins-Minerals (PRESERVISION AREDS 2 PO), Take 1 tablet by mouth Daily., Disp: , Rfl:     Multiple Vitamins-Minerals (ZINC PO), Take  by mouth., Disp: , Rfl:     Omega-3 Fatty Acids (Fish Oil Double Strength) 1200 MG capsule, Take 3,600 mg by mouth 2 (two) times a day., Disp: , Rfl:     Potassium 99 MG tablet, Take  by mouth., Disp: , Rfl:     rivaroxaban (XARELTO) 20 MG tablet, Take 1 tablet by mouth Daily., Disp: , Rfl:     vitamin B-12 (CYANOCOBALAMIN) 1000 MCG tablet, Take 5 tablets by mouth Daily., Disp: , Rfl:     vitamin E 400 UNIT capsule, Take 1 capsule by mouth Daily., Disp: , Rfl:     Zinc 50 MG tablet, Take 1 tablet by mouth Daily., Disp: , Rfl:     Review of Systems   Constitutional: Negative for chills and fever.   Cardiovascular:  Positive for syncope. Negative for chest pain, irregular heartbeat and paroxysmal nocturnal dyspnea.   Respiratory:  Negative for cough and shortness of breath.    Skin:  Negative for rash.   Musculoskeletal:  Positive for falls.   Gastrointestinal:  Negative for abdominal pain and heartburn.   Neurological:  Negative for dizziness and numbness.       Objective  /80 (BP Location: Left arm, Patient Position: Sitting, Cuff Size: Adult)   Pulse 68   Ht 167.6 cm (65.98\")   Wt 68.5 kg (151 lb)   SpO2 98%   BMI 24.38 kg/m²   Constitutional:       Appearance: " Well-developed.   HENT:      Head: Normocephalic and atraumatic.   Pulmonary:      Effort: Pulmonary effort is normal.      Breath sounds: Normal breath sounds.   Cardiovascular:      Normal rate. Irregularly irregular rhythm.      Murmurs: There is no murmur.      No gallop.  No click.   Edema:     Peripheral edema absent.   Skin:     General: Skin is warm and dry.   Neurological:      Mental Status: Alert and oriented to person, place, and time.       Procedures  CHADS-VASc Risk Assessment              4 Total Score    1 Hypertension    2 Age >/= 75    1 Sex: Female        Criteria that do not apply:    CHF    DM    PRIOR STROKE/TIA/THROMBO    Vascular Disease    Age 65-74             ICD-10-CM ICD-9-CM   1. Paroxysmal atrial fibrillation  I48.0 427.31   2. Current use of long term anticoagulation  Z79.01 V58.61   3. Primary hypertension  I10 401.9   4. History of subdural hematoma  Z86.79 V12.59         Assessment/Plan:  Based on the history, it has been determined that the patient is a poor candidate for long-term oral anticoagulation.  The patient may, however, be tolerant to short-term anticoagulation as necessary.    Specifically regarding anticoagulation they have demonstrated: fall, Subdural hematoma    We have discussed that you need stroke and bleeding risk on and off anticoagulation.  The individual [id number] stroke risk store is 4 (hypertension, age > 75, female sex), indicating an adjusted stroke rate of 4%/year.    Based on both stroke and bleeding risk, shared decision has been made to pursue closure of the left atrial appendage is a safe, effective alternative to long-term oral anticoagulation therapy for stroke prophylaxis.  This will reduce her long-term risk of incidence of bleeding.  Information regarding left atrial appendage closure and shared decision making were provided to patient.      Electronically signed by Lucas Bridges MD at 10/16/23 1531          Discharge [place]        [place]  JAQUELINE Patiño at 10/25/23 0443            Problem: Adult Inpatient Plan of Care  Goal: Plan of Care Review  Outcome: Ongoing, Progressing  Flowsheets (Taken 10/25/2023 0441)  Progress: improving  Plan of Care Reviewed With: patient  Outcome Evaluation: A/O, VSS, up ad elizabeth. Pt c/o pain, prn pain medication given with relief. Pt c/o sore throat, prn chloraseptic spray given with relief. Pedal pulse palpable, watchman site soft, nontender, no drainage. IV fluids stopped during shift. Safety maintained, call light within reach. SA 60-70 on tele.             Electronically signed by Kaylyn Cuba RN at 10/25/23 0444       Brandt Wan RN at 10/24/23 1805          R groin site CDI. DRG CDI. Pulses palp. No complaints of pain     Electronically signed by Brandt Wan RN at 10/24/23 1805       Brandt Wan RN at 10/24/23 1746            Problem: Adult Inpatient Plan of Care  Goal: Plan of Care Review  Outcome: Ongoing, Progressing  Goal: Patient-Specific Goal (Individualized)  Outcome: Ongoing, Progressing  Goal: Absence of Hospital-Acquired Illness or Injury  Outcome: Ongoing, Progressing  Intervention: Identify and Manage Fall Risk  Recent Flowsheet Documentation  Taken 10/24/2023 1704 by Brandt Wan RN  Safety Promotion/Fall Prevention: safety round/check completed  Taken 10/24/2023 1644 by Brandt Wan RN  Safety Promotion/Fall Prevention: safety round/check completed  Intervention: Prevent Skin Injury  Recent Flowsheet Documentation  Taken 10/24/2023 1704 by Brandt Wan RN  Body Position: supine  Taken 10/24/2023 1644 by Brandt Wan RN  Body Position: supine  Intervention: Prevent and Manage VTE (Venous Thromboembolism) Risk  Recent Flowsheet Documentation  Taken 10/24/2023 1644 by Brandt Wan RN  Activity Management: bedrest  VTE Prevention/Management: (xarelto) other (see comments)  Goal: Optimal Comfort and Wellbeing  Outcome: Ongoing, Progressing  Intervention: Provide Person-Centered Care  Recent Flowsheet  Documentation  Taken 10/24/2023 1644 by Brandt Wan RN  Trust Relationship/Rapport: care explained  Goal: Readiness for Transition of Care  Outcome: Ongoing, Progressing  Intervention: Mutually Develop Transition Plan  Recent Flowsheet Documentation  Taken 10/24/2023 1700 by Brandt Wan RN  Equipment Currently Used at Home: none  Transportation Anticipated: car, drives self  Patient/Family Anticipated Services at Transition: none  Patient/Family Anticipates Transition to: home with family     Problem: Chest Pain  Goal: Resolution of Chest Pain Symptoms  Outcome: Ongoing, Progressing     Problem: Hypertension Comorbidity  Goal: Blood Pressure in Desired Range  Outcome: Ongoing, Progressing  Intervention: Maintain Blood Pressure Management  Recent Flowsheet Documentation  Taken 10/24/2023 1644 by Brandt Wan RN  Medication Review/Management: medications reviewed     Problem: Skin Injury Risk Increased  Goal: Skin Health and Integrity  Outcome: Ongoing, Progressing  Intervention: Optimize Skin Protection  Recent Flowsheet Documentation  Taken 10/24/2023 1644 by Brandt Wan RN  Head of Bed (HOB) Positioning: HOB flat   Goal Outcome Evaluation:                          Electronically signed by Brandt Wan RN at 10/24/23 1746       Brandt Wan RN at 10/24/23 1744          R groin site CDI. DRG CDI. Pulses palp. No complaints of pain     Electronically signed by Brandt Wan RN at 10/24/23 1744       Brandt Wan RN at 10/24/23 1706          R groin site CDI. DRG CDI. Pulses palp. No complaints of pain     Electronically signed by Brandt Wan RN at 10/24/23 1706       Brandt Wan RN at 10/24/23 1630          R groin site CDI. DRG CDI. Pulses palp. No complaints of pain.    Electronically signed by Brandt Wan RN at 10/24/23 1706       Ad Ahmadi MD at 10/24/23 1319            H&P reviewed. The patient was examined and there are no changes to the H&P.   The Watchman procedure was discussed  in detail with the patient, including the risks (including bleeding, infection, vascular damage [including minor oozing, bruising, bleeding, pericardial effusion [and potential need for drainage] and up to and including the need for vascular or CT surgery], contrast reaction, renal failure, respiratory failure, heart attack, stroke, arrhythmia and even death), benefits, and alternatives and the patient has voiced understanding and is willing to proceed.          Electronically signed by Ad Ahmadi MD at 10/24/23 4546   Source Note            Reason for Visit: Left atrial appendage closure shared decision making.     HPI:  Vane Pyle is a 78 y.o. female is here today for Left atrial appendage closure shared decision making.  She follows in cardiology clinic with Dr. Ahmadi for paroxysmal atrial fibrillation.  She recently fell at the end of August and hit her head, with subsequent development of a subdural hematoma.  She passed out prior to falling while she was talking on the phone.  She is therefore felt to be a poor candidate for chronic anticoagulation and is being worked up for alternative means of thromboembolic prophylaxis with left atrial appendage occlusion with the Watchman device.  She had CASS done on 10/11/2023 showing no evidence of left atrial appendage thrombus.        Patient Active Problem List   Diagnosis    A-fib    Hypertension    Current use of long term anticoagulation    UTI (urinary tract infection)     Social History     Tobacco Use    Smoking status: Never    Smokeless tobacco: Never   Vaping Use    Vaping Use: Never used   Substance Use Topics    Alcohol use: No    Drug use: No     Family History   Problem Relation Age of Onset    Stroke Sister     Hypertension Sister     Stroke Mother     Hypertension Mother     Stroke Father     Hypertension Father     Arrhythmia Brother      The following portions of the patient's history were reviewed and updated as appropriate:  allergies, current medications, past family history, past medical history, past social history, past surgical history, and problem list.      Current Outpatient Medications:     amiodarone (PACERONE) 200 MG tablet, Take 1 tablet by mouth Daily., Disp: , Rfl:     BIOTIN 5000 PO, Take  by mouth., Disp: , Rfl:     Calcium Carbonate-Vitamin D 600-200 MG-UNIT tablet, Calcium 600 with Vitamin D3 600 mg (1,500 mg)-200 unit tablet  Take 1 tablet every day by oral route., Disp: , Rfl:     cefdinir (OMNICEF) 300 MG capsule, Take 1 capsule by mouth 2 (Two) Times a Day., Disp: 20 capsule, Rfl: 0    Cholecalciferol (VITAMIN D3) 50 MCG (2000 UT) capsule, Take 1 capsule by mouth Daily., Disp: , Rfl:     conjugated estrogens (Premarin) 0.625 MG/GM vaginal cream, Insert 0,5gm Monday, Wednesday and Friday, Disp: 1 each, Rfl: 11    Cranberry 1000 MG capsule, Take  by mouth., Disp: , Rfl:     dicyclomine (BENTYL) 10 MG capsule, Take 1 capsule by mouth As Needed., Disp: , Rfl:     dilTIAZem (CARDIZEM) 30 MG tablet, Take 1 tablet by mouth As Needed (HR sustained over 150 bpm)., Disp: 90 tablet, Rfl: 4    ferrous sulfate 324 (65 Fe) MG tablet delayed-release EC tablet, Take 1 tablet by mouth Daily With Breakfast., Disp: , Rfl:     Glucosamine 500 MG capsule, Take  by mouth., Disp: , Rfl:     lisinopril (PRINIVIL,ZESTRIL) 20 MG tablet, Take 1 tablet by mouth Daily., Disp: , Rfl:     Magnesium Gluconate (MAGNESIUM 27 PO), Take  by mouth., Disp: , Rfl:     Mirabegron ER (MYRBETRIQ) 50 MG tablet sustained-release 24 hour 24 hr tablet, Take 50 mg by mouth Daily., Disp: 90 tablet, Rfl: 3    Multiple Vitamins-Minerals (PRESERVISION AREDS 2 PO), Take 1 tablet by mouth Daily., Disp: , Rfl:     Multiple Vitamins-Minerals (ZINC PO), Take  by mouth., Disp: , Rfl:     Omega-3 Fatty Acids (Fish Oil Double Strength) 1200 MG capsule, Take 3,600 mg by mouth 2 (two) times a day., Disp: , Rfl:     Potassium 99 MG tablet, Take  by mouth., Disp: , Rfl:      "rivaroxaban (XARELTO) 20 MG tablet, Take 1 tablet by mouth Daily., Disp: , Rfl:     vitamin B-12 (CYANOCOBALAMIN) 1000 MCG tablet, Take 5 tablets by mouth Daily., Disp: , Rfl:     vitamin E 400 UNIT capsule, Take 1 capsule by mouth Daily., Disp: , Rfl:     Zinc 50 MG tablet, Take 1 tablet by mouth Daily., Disp: , Rfl:     Review of Systems   Constitutional: Negative for chills and fever.   Cardiovascular:  Positive for syncope. Negative for chest pain, irregular heartbeat and paroxysmal nocturnal dyspnea.   Respiratory:  Negative for cough and shortness of breath.    Skin:  Negative for rash.   Musculoskeletal:  Positive for falls.   Gastrointestinal:  Negative for abdominal pain and heartburn.   Neurological:  Negative for dizziness and numbness.       Objective  /80 (BP Location: Left arm, Patient Position: Sitting, Cuff Size: Adult)   Pulse 68   Ht 167.6 cm (65.98\")   Wt 68.5 kg (151 lb)   SpO2 98%   BMI 24.38 kg/m²   Constitutional:       Appearance: Well-developed.   HENT:      Head: Normocephalic and atraumatic.   Pulmonary:      Effort: Pulmonary effort is normal.      Breath sounds: Normal breath sounds.   Cardiovascular:      Normal rate. Irregularly irregular rhythm.      Murmurs: There is no murmur.      No gallop.  No click.   Edema:     Peripheral edema absent.   Skin:     General: Skin is warm and dry.   Neurological:      Mental Status: Alert and oriented to person, place, and time.       Procedures  CHADS-VASc Risk Assessment              4 Total Score    1 Hypertension    2 Age >/= 75    1 Sex: Female        Criteria that do not apply:    CHF    DM    PRIOR STROKE/TIA/THROMBO    Vascular Disease    Age 65-74             ICD-10-CM ICD-9-CM   1. Paroxysmal atrial fibrillation  I48.0 427.31   2. Current use of long term anticoagulation  Z79.01 V58.61   3. Primary hypertension  I10 401.9   4. History of subdural hematoma  Z86.79 V12.59       Assessment/Plan:  Based on the history, it has been " determined that the patient is a poor candidate for long-term oral anticoagulation.  The patient may, however, be tolerant to short-term anticoagulation as necessary.    Specifically regarding anticoagulation they have demonstrated: fall, Subdural hematoma    We have discussed that you need stroke and bleeding risk on and off anticoagulation.  The individual [id number] stroke risk store is 4 (hypertension, age > 75, female sex), indicating an adjusted stroke rate of 4%/year.    Based on both stroke and bleeding risk, shared decision has been made to pursue closure of the left atrial appendage is a safe, effective alternative to long-term oral anticoagulation therapy for stroke prophylaxis.  This will reduce her long-term risk of incidence of bleeding.  Information regarding left atrial appendage closure and shared decision making were provided to patient.      Electronically signed by Lucas Bridges MD at 10/16/23 6020

## 2023-10-30 ENCOUNTER — OFFICE VISIT (OUTPATIENT)
Dept: UROLOGY | Facility: CLINIC | Age: 78
End: 2023-10-30
Payer: MEDICARE

## 2023-10-30 VITALS — HEIGHT: 66 IN | TEMPERATURE: 97.8 F | WEIGHT: 152.4 LBS | BODY MASS INDEX: 24.49 KG/M2

## 2023-10-30 DIAGNOSIS — N39.0 RECURRENT UTI: Primary | ICD-10-CM

## 2023-10-30 LAB
BILIRUB BLD-MCNC: NEGATIVE MG/DL
CLARITY, POC: CLEAR
COLOR UR: YELLOW
GLUCOSE UR STRIP-MCNC: NEGATIVE MG/DL
KETONES UR QL: ABNORMAL
LEUKOCYTE EST, POC: ABNORMAL
NITRITE UR-MCNC: POSITIVE MG/ML
PH UR: 7 [PH] (ref 5–8)
PROT UR STRIP-MCNC: NEGATIVE MG/DL
RBC # UR STRIP: ABNORMAL /UL
SP GR UR: 1.02 (ref 1–1.03)
UROBILINOGEN UR QL: ABNORMAL

## 2023-10-30 PROCEDURE — 1159F MED LIST DOCD IN RCRD: CPT

## 2023-10-30 PROCEDURE — 81001 URINALYSIS AUTO W/SCOPE: CPT

## 2023-10-30 PROCEDURE — 99214 OFFICE O/P EST MOD 30 MIN: CPT

## 2023-10-30 PROCEDURE — 1160F RVW MEDS BY RX/DR IN RCRD: CPT

## 2023-11-01 ENCOUNTER — PREP FOR SURGERY (OUTPATIENT)
Dept: OTHER | Facility: HOSPITAL | Age: 78
End: 2023-11-01
Payer: MEDICARE

## 2023-11-01 DIAGNOSIS — I48.0 PAF (PAROXYSMAL ATRIAL FIBRILLATION): Primary | ICD-10-CM

## 2023-11-01 RX ORDER — SODIUM CHLORIDE 0.9 % (FLUSH) 0.9 %
10 SYRINGE (ML) INJECTION AS NEEDED
OUTPATIENT
Start: 2023-11-01

## 2023-11-01 RX ORDER — SODIUM CHLORIDE 0.9 % (FLUSH) 0.9 %
10 SYRINGE (ML) INJECTION EVERY 12 HOURS SCHEDULED
OUTPATIENT
Start: 2023-11-01

## 2023-11-01 RX ORDER — SODIUM CHLORIDE 9 MG/ML
20 INJECTION, SOLUTION INTRAVENOUS CONTINUOUS
OUTPATIENT
Start: 2023-11-01

## 2023-11-02 ENCOUNTER — TELEPHONE (OUTPATIENT)
Dept: UROLOGY | Facility: CLINIC | Age: 78
End: 2023-11-02
Payer: MEDICARE

## 2023-11-02 DIAGNOSIS — N30.00 ACUTE CYSTITIS WITHOUT HEMATURIA: Primary | ICD-10-CM

## 2023-11-02 RX ORDER — LEVOFLOXACIN 500 MG/1
500 TABLET, FILM COATED ORAL DAILY
Qty: 7 TABLET | Refills: 0 | Status: SHIPPED | OUTPATIENT
Start: 2023-11-02 | End: 2023-11-09

## 2023-11-02 NOTE — TELEPHONE ENCOUNTER
Trying to reach patient to let her know her urine culture was positive and a antibiotic was sent to her pharmacy.    HUB CAN READ.

## 2023-11-02 NOTE — TELEPHONE ENCOUNTER
----- Message from JUSTIN Byrd sent at 11/2/2023  7:58 AM CDT -----  Culture positive have sent in levaquin

## 2023-11-08 ENCOUNTER — TELEPHONE (OUTPATIENT)
Dept: UROLOGY | Facility: CLINIC | Age: 78
End: 2023-11-08
Payer: MEDICARE

## 2023-11-08 NOTE — TELEPHONE ENCOUNTER
Patient called asking for a refill on her Levaquin, told her that she would have to have an office visit in order to get a refill. Patient denied.

## 2023-11-16 NOTE — PROGRESS NOTES
"[facility] - Infectious Diseases Consult Note    Patient:  Vane Pyle 78 y.o. female  YOB: 1945  MRN: [id number]  Primary Care Physician: Harjeet Pedraza DO  REFERRING PROVIDER: Nickie Chung AP*    Chief Complaint  Urinary Tract Infection (Recurrent, with no urinary complaints today.  She just finished 7 days of levofloxacin.)        History of Present Illness  aVne Pyle presents to [facility] INFECTI[facility]ES as a referral from JUSTIN Martell for recurrent urinary tract infections    Patient reports she has had urinary tract infections for years and years.  She generally went to a walk-in clinic or her primary Dr. Pedraza and was subsequently referred to urology because of recurrent issues.  She indicates she had a cystoscopy per Dr. Reyez.  She has had a bladder suspension repair surgery per Dr. Obando in [place].  She has been on multiple antibiotics and most recently was on levofloxacin.  She is concerned that her only symptom is urinary frequency.  She said she has never had dysuria, suprapubic pain, fever or chills.  She has had in and out catheterizations at JUSTIN Fu's office in past.    Patient's primary concern is that she is on Myrbetriq and she is not going to know \"when I have a UTI\".  She said she has talked to JUSTIN Fu about this and Nickie does not feel like this is a concern.    No kidney stones nor stents    She fell 8/28/23 and had a subdural hematoma.  She was transferred from [place] to [facility] in [place].  She was discovered to have a UTI that she was unaware of.  She indicates that she wondered if she had a urinary tract infection and that is why she fell in the first place.  When asked her more questions about that she said she was on the phone she felt absolutely fine she stood up and fell.  To her understanding she was not septic when she was seen at the outside hospital there was no mention of fever, hypotension, " leukocytosis.  She was transferred to the outside hospital and it was not until she was there that she said she had a strong odor to her urine and then was told she had a UTI    She thinks she was at [facility].    Patient has watchman in heart placed 2 to 3 weeks ago.  She has wondered if her heart  may be the reason she passed out.        Past Medical History:   Diagnosis Date    A-fib     Hypertension     Urinary tract infection      Past Surgical History:   Procedure Laterality Date    APPENDECTOMY      ATRIAL APPENDAGE EXCLUSION LEFT WITH TRANSESOPHAGEAL ECHOCARDIOGRAM Right 10/24/2023    Procedure: Atrial Appendage Occlusion;  Surgeon: Ad Ahmadi MD;  Location:  [facility] CATH INVASIVE LOCATION;  Service: Cardiology;  Laterality: Right;    BLADDER SURGERY      CHOLECYSTECTOMY      EYE SURGERY      FOOT FRACTURE SURGERY      HYSTERECTOMY       Cholecystectomy in 2000      Family History   Problem Relation Age of Onset    Stroke Sister     Hypertension Sister     Stroke Mother     Hypertension Mother     Stroke Father     Hypertension Father     Arrhythmia Brother      Social History     Socioeconomic History    Marital status:    Tobacco Use    Smoking status: Never     Passive exposure: Never    Smokeless tobacco: Never   Vaping Use    Vaping Use: Never used   Substance and Sexual Activity    Alcohol use: Never    Drug use: Never    Sexual activity: Defer     Current Outpatient Medications on File Prior to Visit   Medication Sig    amiodarone (PACERONE) 200 MG tablet Take 1 tablet by mouth Daily.    aspirin 81 MG EC tablet Take 1 tablet by mouth Daily.    BIOTIN 5000 PO Take 1 capsule by mouth Daily.    Cholecalciferol (VITAMIN D3) 50 MCG (2000 UT) capsule Take 1 capsule by mouth Daily.    clopidogrel (PLAVIX) 75 MG tablet Take 1 tablet by mouth Daily.    conjugated estrogens (Premarin) 0.625 MG/GM vaginal cream Insert 0,5gm Monday, Wednesday and Friday    Cranberry 1000 MG capsule Take 1  "tablet by mouth Daily.    D-MANNOSE PO Take  by mouth.    dicyclomine (BENTYL) 10 MG capsule Take 1 capsule by mouth 4 (Four) Times a Day Before Meals & at Bedtime As Needed for Abdominal Cramping.    dilTIAZem (CARDIZEM) 30 MG tablet Take 1 tablet by mouth As Needed (HR sustained over 150 bpm).    ferrous sulfate 324 (65 Fe) MG tablet delayed-release EC tablet Take 1 tablet by mouth Daily With Breakfast.    Glucosamine 500 MG capsule Take 1 capsule by mouth Daily.    lisinopril (PRINIVIL,ZESTRIL) 20 MG tablet Take 1 tablet by mouth Daily.    Mirabegron ER (MYRBETRIQ) 50 MG tablet sustained-release 24 hour 24 hr tablet Take 50 mg by mouth Daily.    Multiple Vitamins-Minerals (PRESERVISION AREDS 2 PO) Take 1 tablet by mouth Daily.    Multiple Vitamins-Minerals (ZINC PO) Take  by mouth.    Omega-3 Fatty Acids (Fish Oil Double Strength) 1200 MG capsule Take 3,600 mg by mouth 2 (two) times a day.    Potassium 99 MG tablet Take 1 tablet by mouth Daily.    vitamin B-12 (CYANOCOBALAMIN) 1000 MCG tablet Take 5 tablets by mouth Daily.    vitamin E 400 UNIT capsule Take 1 capsule by mouth Daily.    Zinc 50 MG tablet Take 1 tablet by mouth Daily.    [DISCONTINUED] Magnesium Gluconate (MAGNESIUM 27 PO) Take 1 tablet by mouth Daily.     No current facility-administered medications on file prior to visit.     Allergies   Allergen Reactions    Latex Hives and Itching    Sulfa Antibiotics Itching       Venous Access Review  Line/IV site: No current IV Access    Antimicrobial Review  Currently on antibiotics/antifungals: no  Start Date of Therapy: 11- - 11-: levofloxacin  09-: Augmentin 500/125 MG  09-: Macrobid                         If therapy completed, date complete: 11-    Objective   Vital Signs:  /74 Comment: manual  Pulse 73   Temp 98.3 °F (36.8 °C) (Temporal)   Ht 167.6 cm (66\")   Wt 67.6 kg (149 lb)   SpO2 98%   BMI 24.05 kg/m²   Estimated body mass index is 24.05 kg/m² as " "calculated from the following:    Height as of this encounter: 167.6 cm (66\").    Weight as of this encounter: 67.6 kg (149 lb).             Physical Exam       DATA REVIEWED:  The following data was reviewed by: Geno Washington MD on 11/17/2023:  Lab Results - Last 18 Months   Lab Units 10/25/23  0415 10/23/23  1128   CREATININE mg/dL 0.72 0.70   ALT (SGPT) U/L  --  11   AST (SGOT) U/L  --  23   BUN / CREAT RATIO  16.7 22.9   POTASSIUM mmol/L 3.8 5.2     Lab Results   Component Value Date    WBC 9.21 10/25/2023    RBC 3.92 10/25/2023    HGB 11.8 (L) 10/25/2023    HCT 37.0 10/25/2023    RDWSD 47.7 10/25/2023    MPV 11.5 10/25/2023     10/25/2023     Urine dipsticks from May 12, 2023 with trace leukocytes, from 9/21/2023 small leukocytes and from October 30, 2023 small leukocytes           Reviewed note from [facility] JUSTIN Martell           Assessment and Plan   Diagnoses and all orders for this visit:    1. Recurrent UTI (Primary)  Comments:  Patient indicates symptoms in past only frequency.          Patient Instructions   Dr. Mansfield needs to talk to JUSTIN Bingham  Gave patient card with the amount of water that she needs to drink daily based on weight  Patient to call the office if she develops any symptoms she think may be related to developing a urinary tract infection such as dysuria, frequency, urgency, suprapubic discomfort, fever, etc.          Follow Up   Return if symptoms worsen or fail to improve.  Patient was given instructions and counseling regarding her condition or for health maintenance advice. Please see specific information pulled into the AVS if appropriate.     >>>>> If patient develops concerns that she is developing a urinary tract infection she is to call us.  We will evaluate and plan for patient to have an in and out catheterization for urinalysis with reflex to culture.  She said she would be able to go to JUSTIN Fu's office to have this done     "

## 2023-11-17 ENCOUNTER — OFFICE VISIT (OUTPATIENT)
Age: 78
End: 2023-11-17
Payer: MEDICARE

## 2023-11-17 VITALS
SYSTOLIC BLOOD PRESSURE: 158 MMHG | DIASTOLIC BLOOD PRESSURE: 74 MMHG | OXYGEN SATURATION: 98 % | BODY MASS INDEX: 23.95 KG/M2 | WEIGHT: 149 LBS | HEIGHT: 66 IN | TEMPERATURE: 98.3 F | HEART RATE: 73 BPM

## 2023-11-17 DIAGNOSIS — N39.0 RECURRENT UTI: Primary | ICD-10-CM

## 2023-11-17 PROBLEM — S06.5XAA SUBDURAL HEMATOMA: Status: ACTIVE | Noted: 2023-11-17

## 2023-11-17 PROBLEM — I48.91 ATRIAL FIBRILLATION: Status: ACTIVE | Noted: 2023-10-17

## 2023-11-17 NOTE — PATIENT INSTRUCTIONS
Dr. Mansfield needs to talk to JUSTIN Bingham  Gave patient card with the amount of water that she needs to drink daily based on weight  Patient to call the office if she develops any symptoms she think may be related to developing a urinary tract infection such as dysuria, frequency, urgency, suprapubic discomfort, fever, etc.

## 2023-11-21 ENCOUNTER — PATIENT ROUNDING (BHMG ONLY) (OUTPATIENT)
Age: 78
End: 2023-11-21
Payer: MEDICARE

## 2023-11-21 NOTE — PROGRESS NOTES
November 21, 2023    Hello, may I speak with Vane Pyle?    My name is Kathleen       I am  with [facility]  [facility] [address] [phone].    Before we get started may I verify your date of birth? 1945    I am calling to officially welcome you to our practice and ask about your recent visit. Is this a good time to talk? no    Tell me about your visit with us. What things went well?  Had to leave a voicemail message.        We're always looking for ways to make our patients' experiences even better. Do you have recommendations on ways we may improve?  no    Overall were you satisfied with your first visit to our practice? yes       I appreciate you taking the time to speak with me today. Is there anything else I can do for you? no      Thank you, and have a great day.

## 2023-11-27 ENCOUNTER — TELEPHONE (OUTPATIENT)
Dept: CARDIOLOGY | Facility: CLINIC | Age: 78
End: 2023-11-27
Payer: MEDICARE

## 2023-11-28 ENCOUNTER — TELEPHONE (OUTPATIENT)
Dept: UROLOGY | Facility: CLINIC | Age: 78
End: 2023-11-28
Payer: MEDICARE

## 2023-11-28 DIAGNOSIS — N30.20 CHRONIC CYSTITIS: Primary | ICD-10-CM

## 2023-11-28 NOTE — TELEPHONE ENCOUNTER
Patient called back to our office and I spoke to her.  Patient has been going back and forth between our office, her PCP, [facility], and [facility], trying to get her medical records from us faxed to [facility] so she may establish care there.      Patient lives in [place], [place], and this will be closer to her.  She told me on this call that [facility] needs us to fax the records to them, but they have refused to fax a request to us.    They want us to send a referral to them from Nickie.      Per Sheron, Nickie has already referred this patient out to [facility].  I told the patient that I would ask around and see if there is any way we can get this provider (or patient) her records without her having to drive to [place].  I told her I would give her a call back either later today or tomorrow morning.

## 2023-11-28 NOTE — TELEPHONE ENCOUNTER
Patient called stating that we are needing to send information to a Urologist in [place], I spoke with Jenny MARTINEZ, she advised me to tell pt that urology office will need to send a request for information to our office.  Patient got upset and v/u

## 2023-11-28 NOTE — TELEPHONE ENCOUNTER
I am fine with placing referral to wherever is needed however I am also fine with seeing patient here in our office as patient has already been seen by infectious disease and it does not appear infectious disease recommended any different treatment.  Please find out what patient would like for us to do either way I would be glad to do it

## 2023-12-05 ENCOUNTER — ANESTHESIA EVENT (OUTPATIENT)
Dept: CARDIOLOGY | Facility: HOSPITAL | Age: 78
End: 2023-12-05
Payer: MEDICARE

## 2023-12-06 ENCOUNTER — ANESTHESIA (OUTPATIENT)
Dept: CARDIOLOGY | Facility: HOSPITAL | Age: 78
End: 2023-12-06
Payer: MEDICARE

## 2023-12-06 ENCOUNTER — HOSPITAL ENCOUNTER (OUTPATIENT)
Dept: CARDIOLOGY | Facility: HOSPITAL | Age: 78
Discharge: HOME | End: 2023-12-06
Payer: MEDICARE

## 2023-12-06 VITALS
RESPIRATION RATE: 16 BRPM | BODY MASS INDEX: 28.13 KG/M2 | HEART RATE: 58 BPM | HEIGHT: 61 IN | TEMPERATURE: 97.8 F | OXYGEN SATURATION: 98 % | DIASTOLIC BLOOD PRESSURE: 59 MMHG | WEIGHT: 149 LBS | SYSTOLIC BLOOD PRESSURE: 116 MMHG

## 2023-12-06 DIAGNOSIS — I48.0 PAF (PAROXYSMAL ATRIAL FIBRILLATION): ICD-10-CM

## 2023-12-06 PROCEDURE — 25810000003 SODIUM CHLORIDE 0.9 % SOLUTION: Performed by: NURSE ANESTHETIST, CERTIFIED REGISTERED

## 2023-12-06 PROCEDURE — 25010000002 PROPOFOL 10 MG/ML EMULSION: Performed by: NURSE ANESTHETIST, CERTIFIED REGISTERED

## 2023-12-06 PROCEDURE — 93325 DOPPLER ECHO COLOR FLOW MAPG: CPT

## 2023-12-06 RX ORDER — PROPOFOL 10 MG/ML
VIAL (ML) INTRAVENOUS AS NEEDED
Status: DISCONTINUED | OUTPATIENT
Start: 2023-12-06 | End: 2023-12-06 | Stop reason: SURG

## 2023-12-06 RX ORDER — SODIUM CHLORIDE 0.9 % (FLUSH) 0.9 %
10 SYRINGE (ML) INJECTION EVERY 12 HOURS SCHEDULED
Status: DISCONTINUED | OUTPATIENT
Start: 2023-12-06 | End: 2023-12-07 | Stop reason: HOSPADM

## 2023-12-06 RX ORDER — SODIUM CHLORIDE 9 MG/ML
INJECTION, SOLUTION INTRAVENOUS CONTINUOUS PRN
Status: DISCONTINUED | OUTPATIENT
Start: 2023-12-06 | End: 2023-12-06 | Stop reason: SURG

## 2023-12-06 RX ORDER — LIDOCAINE HYDROCHLORIDE 20 MG/ML
INJECTION, SOLUTION EPIDURAL; INFILTRATION; INTRACAUDAL; PERINEURAL AS NEEDED
Status: DISCONTINUED | OUTPATIENT
Start: 2023-12-06 | End: 2023-12-06 | Stop reason: SURG

## 2023-12-06 RX ORDER — SODIUM CHLORIDE 0.9 % (FLUSH) 0.9 %
10 SYRINGE (ML) INJECTION AS NEEDED
Status: DISCONTINUED | OUTPATIENT
Start: 2023-12-06 | End: 2023-12-07 | Stop reason: HOSPADM

## 2023-12-06 RX ORDER — SODIUM CHLORIDE 9 MG/ML
20 INJECTION, SOLUTION INTRAVENOUS CONTINUOUS
Status: DISCONTINUED | OUTPATIENT
Start: 2023-12-06 | End: 2023-12-07 | Stop reason: HOSPADM

## 2023-12-06 RX ADMIN — LIDOCAINE HYDROCHLORIDE 100 MG: 20 INJECTION, SOLUTION EPIDURAL; INFILTRATION; INTRACAUDAL; PERINEURAL at 09:06

## 2023-12-06 RX ADMIN — SODIUM CHLORIDE: 9 INJECTION, SOLUTION INTRAVENOUS at 08:59

## 2023-12-06 RX ADMIN — PROPOFOL 200 MG: 10 INJECTION, EMULSION INTRAVENOUS at 09:06

## 2023-12-06 NOTE — ANESTHESIA POSTPROCEDURE EVALUATION
"Patient: Vane Pyle    Procedure Summary       Date: 12/06/23 Room / Location: [facility] CATH LAB; [facility] CARDIOLOGY    Anesthesia Start: 0859 Anesthesia Stop: 0923    Procedure: ADULT TRANSESOPHAGEAL ECHO (CASS) W/ CONT IF NECESSARY PER PROTOCOL Diagnosis:       PAF (paroxysmal atrial fibrillation)      (Re-evaluation of Prior CASS for Interval Change)    Scheduled Providers: Ad Ahmadi MD Provider: Roxane Lees CRNA    Anesthesia Type: MAC ASA Status: 2            Anesthesia Type: MAC    Vitals  Vitals Value Taken Time   /58 12/06/23 1001   Temp     Pulse 58 12/06/23 1013   Resp 15 12/06/23 1000   SpO2 99 % 12/06/23 1013   Vitals shown include unfiled device data.        Post Anesthesia Care and Evaluation    Patient location during evaluation: PACU  Patient participation: complete - patient participated  Level of consciousness: awake and alert  Pain management: adequate    Airway patency: patent  Anesthetic complications: No anesthetic complications    Cardiovascular status: acceptable  Respiratory status: acceptable  Hydration status: acceptable    Comments: Blood pressure 144/58, pulse 58, temperature 97.8 °F (36.6 °C), temperature source Temporal, resp. rate 15, height 154.9 cm (61\"), weight 67.6 kg (149 lb), SpO2 97%.    Pt discharged from PACU based on yovanny score >8    "

## 2023-12-06 NOTE — ANESTHESIA PREPROCEDURE EVALUATION
Anesthesia Evaluation     no history of anesthetic complications:   NPO Solid Status: > 8 hours  NPO Liquid Status: > 8 hours           Airway   Mallampati: II  TM distance: >3 FB  Dental - normal exam     Pulmonary - negative pulmonary ROS   Cardiovascular     ECG reviewed  PT is on anticoagulation therapy    (+) hypertension, dysrhythmias Paroxysmal Atrial Fib    ROS comment: Echo 10/2023 Left ventricular systolic function is normal.  ·  Normal right ventricular cavity size and systolic function noted.  ·  No significant valvular abnormalities.  ·  No evidence of left atrial appendage thrombus.      Neuro/Psych- negative ROS  GI/Hepatic/Renal/Endo      Musculoskeletal     Abdominal    Substance History      OB/GYN          Other                      Anesthesia Plan    ASA 2     MAC     intravenous induction     Anesthetic plan, risks, benefits, and alternatives have been provided, discussed and informed consent has been obtained with: patient.      CODE STATUS:

## 2024-04-03 RX ORDER — CLOPIDOGREL BISULFATE 75 MG/1
75 TABLET ORAL DAILY
Qty: 30 TABLET | Refills: 5 | Status: SHIPPED | OUTPATIENT
Start: 2024-04-03

## 2024-04-08 ENCOUNTER — TELEPHONE (OUTPATIENT)
Dept: CARDIOLOGY | Facility: CLINIC | Age: 79
End: 2024-04-08
Payer: MEDICARE

## 2024-04-08 NOTE — TELEPHONE ENCOUNTER
Patient says her heart rate has been running 111-142 off and on since Saturday. She was on a Medrol dose pack and thinks this is what may have started it. She stopped taking it Saturday morning.  Wants to know if she needs to be on Diltiazem while her heart rate is staying up and down. She says this is what she did before and she has some old tablets of Diltiazem but they are from 2019. Please advise.

## 2024-04-23 ENCOUNTER — TELEPHONE (OUTPATIENT)
Dept: CARDIOLOGY | Facility: CLINIC | Age: 79
End: 2024-04-23
Payer: MEDICARE

## 2024-04-23 NOTE — TELEPHONE ENCOUNTER
Call to the pt to let her know that she is 6 months post Watchman and that she can stop taking her Plavix. She was instructed to continue her baby ASA daily. She verbalized understanding.

## 2024-07-01 ENCOUNTER — OFFICE VISIT (OUTPATIENT)
Dept: CARDIOLOGY | Facility: CLINIC | Age: 79
End: 2024-07-01
Payer: MEDICARE

## 2024-07-01 VITALS
BODY MASS INDEX: 27.56 KG/M2 | HEART RATE: 55 BPM | SYSTOLIC BLOOD PRESSURE: 140 MMHG | DIASTOLIC BLOOD PRESSURE: 72 MMHG | OXYGEN SATURATION: 98 % | WEIGHT: 146 LBS | HEIGHT: 61 IN

## 2024-07-01 DIAGNOSIS — I48.0 PAROXYSMAL ATRIAL FIBRILLATION: Primary | Chronic | ICD-10-CM

## 2024-07-01 DIAGNOSIS — I10 PRIMARY HYPERTENSION: Chronic | ICD-10-CM

## 2024-07-01 PROCEDURE — 93000 ELECTROCARDIOGRAM COMPLETE: CPT | Performed by: INTERNAL MEDICINE

## 2024-07-01 PROCEDURE — 1159F MED LIST DOCD IN RCRD: CPT | Performed by: INTERNAL MEDICINE

## 2024-07-01 PROCEDURE — 1160F RVW MEDS BY RX/DR IN RCRD: CPT | Performed by: INTERNAL MEDICINE

## 2024-07-01 PROCEDURE — 3077F SYST BP >= 140 MM HG: CPT | Performed by: INTERNAL MEDICINE

## 2024-07-01 PROCEDURE — 3078F DIAST BP <80 MM HG: CPT | Performed by: INTERNAL MEDICINE

## 2024-07-01 PROCEDURE — 99214 OFFICE O/P EST MOD 30 MIN: CPT | Performed by: INTERNAL MEDICINE

## 2024-07-01 RX ORDER — LISINOPRIL 20 MG/1
20 TABLET ORAL DAILY
Qty: 90 TABLET | Refills: 3 | Status: SHIPPED | OUTPATIENT
Start: 2024-07-01

## 2024-07-01 NOTE — PROGRESS NOTES
Subjective:     Encounter Date:07/01/2024      Patient ID: Vane Pyle is a 78 y.o. female with paroxysmal atrial fibrillation, prior ablation on 2 occasions by Dr. Quinteros, also with hypertension, now status post left atrial appendage exclusion with a 27 mm Watchman FLX device in October 2024 (placed given current falls while anticoagulated), presenting today for routine follow-up.    Chief Complaint: Here for follow-up of atrial fibrillation    History of Present Illness    This patient presents today for follow-up of atrial fibrillation.  She is known to have paroxysmal atrial fibrillation and has undergone an ablation on 2 separate occasions.  She is in atrial fibrillation today but is asymptomatic with no palpitations.  She has not had any recent lightheadedness or dizziness but has had falls in the past and therefore had a Watchman device placed in October 2023.  She has done well since that time.  She remains on aspirin and notes no significant bleeding issues and has not had any falls recently.  Her blood pressure is generally well-controlled on lisinopril.  She denies have any chest discomfort, shortness of breath.  No lower extremity edema.  Overall, she says that she is doing well and feeling well at this time.      Current Outpatient Medications:     amiodarone (PACERONE) 200 MG tablet, Take 1 tablet by mouth Daily., Disp: , Rfl:     aspirin 81 MG EC tablet, Take 1 tablet by mouth Daily., Disp: 30 tablet, Rfl: 11    BIOTIN 5000 PO, Take 1 capsule by mouth Daily., Disp: , Rfl:     Cholecalciferol (VITAMIN D3) 50 MCG (2000 UT) capsule, Take 1 capsule by mouth Daily., Disp: , Rfl:     conjugated estrogens (Premarin) 0.625 MG/GM vaginal cream, Insert 0,5gm Monday, Wednesday and Friday, Disp: 1 each, Rfl: 11    D-MANNOSE PO, Take  by mouth., Disp: , Rfl:     dilTIAZem (CARDIZEM) 30 MG tablet, Take 1 tablet by mouth As Needed (HR sustained over 150 bpm)., Disp: 90 tablet, Rfl: 4    Glucosamine 500 MG  capsule, Take 1 capsule by mouth Daily., Disp: , Rfl:     lisinopril (PRINIVIL,ZESTRIL) 20 MG tablet, Take 1 tablet by mouth Daily., Disp: 90 tablet, Rfl: 3    Mirabegron ER (MYRBETRIQ) 50 MG tablet sustained-release 24 hour 24 hr tablet, Take 50 mg by mouth Daily., Disp: 90 tablet, Rfl: 3    Multiple Vitamins-Minerals (PRESERVISION AREDS 2 PO), Take 1 tablet by mouth Daily., Disp: , Rfl:     Multiple Vitamins-Minerals (ZINC PO), Take  by mouth., Disp: , Rfl:     Allergies   Allergen Reactions    Latex Hives and Itching    Sulfa Antibiotics Itching     Social History     Tobacco Use    Smoking status: Never     Passive exposure: Never    Smokeless tobacco: Never   Substance Use Topics    Alcohol use: Never     Review of Systems   Constitutional: Negative for fever and weight loss.   Cardiovascular:  Negative for chest pain, dyspnea on exertion, leg swelling, orthopnea, palpitations, paroxysmal nocturnal dyspnea and syncope.   Respiratory:  Negative for cough, shortness of breath and wheezing.    Hematologic/Lymphatic: Negative for bleeding problem. Does not bruise/bleed easily.   Gastrointestinal:  Negative for abdominal pain, nausea and vomiting.   Neurological:  Negative for dizziness and light-headedness.         ECG 12 Lead    Date/Time: 7/1/2024 11:15 AM  Performed by: Ad Ahmadi MD    Authorized by: Ad Ahmadi MD  Comparison: compared with previous ECG   Similar to previous ECG  Rhythm: atrial fibrillation  Rate: normal  BPM: 84  Conduction: conduction normal  QRS axis: normal  Other findings: non-specific ST-T wave changes    Clinical impression: abnormal EKG             Objective:     Vitals reviewed.   Constitutional:       General: Not in acute distress.     Appearance: Well-developed and not in distress.   Eyes:      Extraocular Movements: Extraocular movements intact.   HENT:      Head: Normocephalic and atraumatic.   Pulmonary:      Effort: Pulmonary effort is normal.       "Breath sounds: Normal breath sounds. No wheezing. No rhonchi. No rales.   Cardiovascular:      Normal rate. Irregularly irregular rhythm.      Murmurs: There is no murmur.      No gallop.    Pulses:     Intact distal pulses.   Edema:     Peripheral edema absent.   Abdominal:      General: Bowel sounds are normal. There is no distension.      Palpations: Abdomen is soft.      Tenderness: There is no abdominal tenderness.   Skin:     General: Skin is warm and dry. There is no cyanosis.      Findings: No erythema or rash.   Neurological:      Mental Status: Alert and oriented to person, place, and time.      Cranial Nerves: No cranial nerve deficit.       /72   Pulse 55   Ht 154.9 cm (61\")   Wt 66.2 kg (146 lb)   SpO2 98%   BMI 27.59 kg/m²     Data/Lab Review:     Results for orders placed during the hospital encounter of 12/06/23    Adult Transesophageal Echo (CASS) W/ Cont if Necessary Per Protocol    Interpretation Summary    This is a limited echocardiogram to evaluate stability of recently placed Watchman device.    The Watchman device is securely in place with no device related thrombus and no significant leak around the device        Assessment:          Diagnosis Plan   1. Paroxysmal atrial fibrillation        2. Primary hypertension  lisinopril (PRINIVIL,ZESTRIL) 20 MG tablet           Plan:       1.  Paroxysmal atrial fibrillation: Previously, the patient was thought to have paroxysmal atrial fibrillation but she is in atrial fibrillation today and asymptomatic and was also in atrial fibrillation at the time of her last ECG, thus raising suspicion of persistent atrial fibrillation.  However, given that she is now status post left atrial appendage exclusion, I would not necessarily feel that therapy would need to change at this particular time.  She does remain on aspirin 81 mg daily.  She also remains on amiodarone, diltiazem.  Continue these medications at this time.  We will readdress her cardiac " rhythm at next visit.  She will be due for a follow-up CASS in October for surveillance of her Watchman device.    2.  Primary hypertension: Blood pressure is well-controlled.  Continue lisinopril, diltiazem.    We will see the patient again in the office in 12 months unless otherwise needed sooner and we will also reevaluate her Watchman device in October for a 1 year follow-up.

## 2024-08-19 RX ORDER — AMIODARONE HYDROCHLORIDE 200 MG/1
200 TABLET ORAL DAILY
Qty: 90 TABLET | Refills: 3 | Status: SHIPPED | OUTPATIENT
Start: 2024-08-19

## 2024-09-27 RX ORDER — ASPIRIN 81 MG/1
81 TABLET, COATED ORAL DAILY
Qty: 30 TABLET | Refills: 11 | Status: SHIPPED | OUTPATIENT
Start: 2024-09-27

## 2024-10-09 DIAGNOSIS — I10 PRIMARY HYPERTENSION: Chronic | ICD-10-CM

## 2024-10-09 RX ORDER — LISINOPRIL 20 MG/1
20 TABLET ORAL DAILY
Qty: 90 TABLET | Refills: 3 | Status: SHIPPED | OUTPATIENT
Start: 2024-10-09

## 2024-10-14 ENCOUNTER — TELEPHONE (OUTPATIENT)
Dept: CARDIOLOGY | Facility: CLINIC | Age: 79
End: 2024-10-14
Payer: MEDICARE

## 2024-10-14 NOTE — TELEPHONE ENCOUNTER
Patient would like to know if it is time for her to have a CASS? She said she thought she was supposed to have one a year after she had the Watchman and that was in October last year. Please advise.

## 2024-10-15 ENCOUNTER — PREP FOR SURGERY (OUTPATIENT)
Dept: OTHER | Facility: HOSPITAL | Age: 79
End: 2024-10-15
Payer: MEDICARE

## 2024-10-15 DIAGNOSIS — I48.0 PAF (PAROXYSMAL ATRIAL FIBRILLATION): Primary | ICD-10-CM

## 2024-10-15 RX ORDER — SODIUM CHLORIDE 0.9 % (FLUSH) 0.9 %
10 SYRINGE (ML) INJECTION EVERY 12 HOURS SCHEDULED
OUTPATIENT
Start: 2024-10-15

## 2024-10-15 RX ORDER — SODIUM CHLORIDE 0.9 % (FLUSH) 0.9 %
10 SYRINGE (ML) INJECTION AS NEEDED
OUTPATIENT
Start: 2024-10-15

## 2024-10-23 ENCOUNTER — ANESTHESIA EVENT (OUTPATIENT)
Dept: CARDIOLOGY | Facility: HOSPITAL | Age: 79
End: 2024-10-23
Payer: MEDICARE

## 2024-10-23 ENCOUNTER — HOSPITAL ENCOUNTER (OUTPATIENT)
Dept: CARDIOLOGY | Facility: HOSPITAL | Age: 79
Discharge: HOME | End: 2024-10-23
Payer: MEDICARE

## 2024-10-23 ENCOUNTER — ANESTHESIA (OUTPATIENT)
Dept: CARDIOLOGY | Facility: HOSPITAL | Age: 79
End: 2024-10-23
Payer: MEDICARE

## 2024-10-23 VITALS
SYSTOLIC BLOOD PRESSURE: 106 MMHG | OXYGEN SATURATION: 95 % | RESPIRATION RATE: 16 BRPM | DIASTOLIC BLOOD PRESSURE: 58 MMHG | HEART RATE: 59 BPM

## 2024-10-23 VITALS
OXYGEN SATURATION: 97 % | SYSTOLIC BLOOD PRESSURE: 117 MMHG | RESPIRATION RATE: 16 BRPM | DIASTOLIC BLOOD PRESSURE: 58 MMHG | HEIGHT: 66 IN | TEMPERATURE: 97.8 F | BODY MASS INDEX: 23.14 KG/M2 | HEART RATE: 58 BPM | WEIGHT: 144 LBS

## 2024-10-23 DIAGNOSIS — I48.0 PAF (PAROXYSMAL ATRIAL FIBRILLATION): ICD-10-CM

## 2024-10-23 PROCEDURE — 25010000002 PROPOFOL 10 MG/ML EMULSION

## 2024-10-23 PROCEDURE — 25010000002 LIDOCAINE PF 2% 2 % SOLUTION

## 2024-10-23 PROCEDURE — 93312 ECHO TRANSESOPHAGEAL: CPT

## 2024-10-23 PROCEDURE — 93325 DOPPLER ECHO COLOR FLOW MAPG: CPT

## 2024-10-23 RX ORDER — SODIUM CHLORIDE 0.9 % (FLUSH) 0.9 %
10 SYRINGE (ML) INJECTION AS NEEDED
Status: DISCONTINUED | OUTPATIENT
Start: 2024-10-23 | End: 2024-10-24 | Stop reason: HOSPADM

## 2024-10-23 RX ORDER — SODIUM CHLORIDE 0.9 % (FLUSH) 0.9 %
10 SYRINGE (ML) INJECTION EVERY 12 HOURS SCHEDULED
Status: DISCONTINUED | OUTPATIENT
Start: 2024-10-23 | End: 2024-10-24 | Stop reason: HOSPADM

## 2024-10-23 RX ORDER — LIDOCAINE HYDROCHLORIDE 20 MG/ML
INJECTION, SOLUTION EPIDURAL; INFILTRATION; INTRACAUDAL; PERINEURAL AS NEEDED
Status: DISCONTINUED | OUTPATIENT
Start: 2024-10-23 | End: 2024-10-23 | Stop reason: SURG

## 2024-10-23 RX ORDER — PROPOFOL 10 MG/ML
VIAL (ML) INTRAVENOUS AS NEEDED
Status: DISCONTINUED | OUTPATIENT
Start: 2024-10-23 | End: 2024-10-23 | Stop reason: SURG

## 2024-10-23 RX ADMIN — PROPOFOL 200 MG: 10 INJECTION, EMULSION INTRAVENOUS at 08:51

## 2024-10-23 RX ADMIN — LIDOCAINE HYDROCHLORIDE 100 MG: 20 INJECTION, SOLUTION EPIDURAL; INFILTRATION; INTRACAUDAL; PERINEURAL at 08:51

## 2024-10-23 NOTE — ANESTHESIA POSTPROCEDURE EVALUATION
Patient: Vane Pyle    Procedure Summary       Date: 10/23/24 Room / Location: [facility] CATH LAB; [facility] CARDIOLOGY    Anesthesia Start: 0847 Anesthesia Stop: 0906    Procedure: ADULT TRANSESOPHAGEAL ECHO (CASS) W/ CONT IF NECESSARY PER PROTOCOL Diagnosis:       PAF (paroxysmal atrial fibrillation)      (Re-evaluation of Prior CASS for Interval Change)    Scheduled Providers: Ad Ahmadi MD Provider: Radha Estes CRNA    Anesthesia Type: MAC ASA Status: 2            Anesthesia Type: MAC    Vitals  Vitals Value Taken Time   /58 10/23/24 0904   Temp     Pulse 59 10/23/24 0905   Resp 16 10/23/24 0905   SpO2 95 % 10/23/24 0905           Post Anesthesia Care and Evaluation    Patient location during evaluation: bedside  Patient participation: complete - patient participated  Level of consciousness: awake and alert  Pain management: adequate    Airway patency: patent  Anesthetic complications: No anesthetic complications  PONV Status: none  Cardiovascular status: acceptable  Respiratory status: acceptable  Hydration status: acceptable    Comments: Pt discharged from PACU based on yovanny score >8       No anesthesia care post op

## 2024-10-23 NOTE — ANESTHESIA PREPROCEDURE EVALUATION
Anesthesia Evaluation     no history of anesthetic complications:   NPO Solid Status: > 8 hours  NPO Liquid Status: > 8 hours           Airway   Mallampati: II  TM distance: >3 FB  Dental - normal exam     Pulmonary - negative pulmonary ROS   Cardiovascular     ECG reviewed  PT is on anticoagulation therapy    (+) hypertension, dysrhythmias Paroxysmal Atrial Fib    ROS comment:   Left ventricular systolic function is normal.   Normal left ventricular cavity size noted. All left ventricular wall segments contract normally.  Normal right ventricular cavity size and systolic function noted.  The left atrial cavity is dilated. Watchman device is noted in the left atrial appendage.  Device appears to be well-seated.  No significant leak around the device.    No device related thrombus noted.  There is a trivial pericardial effusion.        Neuro/Psych- negative ROS  GI/Hepatic/Renal/Endo - negative ROS     Musculoskeletal (-) negative ROS    Abdominal    Substance History      OB/GYN negative ob/gyn ROS         Other                      Anesthesia Plan    ASA 2     MAC     intravenous induction     Anesthetic plan, risks, benefits, and alternatives have been provided, discussed and informed consent has been obtained with: patient.      CODE STATUS:

## 2025-06-11 ENCOUNTER — TELEPHONE (OUTPATIENT)
Dept: CARDIOLOGY | Facility: CLINIC | Age: 80
End: 2025-06-11
Payer: MEDICARE

## 2025-06-11 NOTE — TELEPHONE ENCOUNTER
Caller: Vane Pyle    Relationship: Self    Best call back number: [phone]     What is the best time to reach you: MORNING    Who are you requesting to speak with (clinical staff, provider,  specific staff member): CLINICAL STAFF    What was the call regarding: PATIENT IS REQUESTING TO TALK TO THE NURSE ABOUT MEDICATIONS ANOTHER DOCTOR TOOK HER OFF OF.

## 2025-06-12 NOTE — TELEPHONE ENCOUNTER
Spoke with patient and she said her blood pressure was high and she has been passing out so she went to the ER in [place]. They took her off the Amiodarone and decreased the Lisinopril to 10 mg. Told her to keep a log of her blood pressure and let me know if it starts going back up again. She verbalized understanding.

## 2025-07-15 NOTE — PROGRESS NOTES
Subjective:     Encounter Date:07/16/2025      Patient ID: Vane Pyle is a 79 y.o. female with paroxysmal atrial fibrillation, prior ablation on 2 occasions by Dr. Quinteros, also with hypertension, now status post left atrial appendage exclusion with a 27 mm Watchman FLX device in October 2024 (placed given current falls while anticoagulated), presenting today for routine follow-up.     Chief Complaint: Here for routine follow-up of atrial fibrillation    History of Present Illness    This patient presents today for routine follow-up of paroxysmal atrial fibrillation.  She says that for the most part she has been doing reasonably well.  She was taken off of amiodarone a while back because she was having some falls and some labile blood pressure readings.  She says that since being off the medication along with being off of a host of vitamin supplements, she is no longer falling and is not currently experiencing any lightheadedness or dizziness.  Her blood pressure is generally stable on home readings at this time.  She denies having any obvious recurrences of atrial fibrillation and says that she is not experiencing palpitations, chest discomfort, significant shortness of breath.  She is status post prior Watchman device placement and remains on aspirin and has no significant bleeding issues.      Current Outpatient Medications:     Aspirin Low Dose 81 MG EC tablet, TAKE 1 TABLET BY MOUTH DAILY., Disp: 30 tablet, Rfl: 11    conjugated estrogens (Premarin) 0.625 MG/GM vaginal cream, Insert 0,5gm Monday, Wednesday and Friday, Disp: 1 each, Rfl: 11    Cranberry 125 MG tablet, Take  by mouth., Disp: , Rfl:     D-MANNOSE PO, Take  by mouth., Disp: , Rfl:     dilTIAZem (CARDIZEM) 30 MG tablet, Take 1 tablet by mouth As Needed (HR sustained over 150 bpm)., Disp: 90 tablet, Rfl: 4    lisinopril (PRINIVIL,ZESTRIL) 20 MG tablet, Take 1 tablet by mouth Daily. (Patient taking differently: Take 0.5 tablets by mouth  Daily.), Disp: 90 tablet, Rfl: 3    Mirabegron ER (MYRBETRIQ) 50 MG tablet sustained-release 24 hour 24 hr tablet, Take 50 mg by mouth Daily., Disp: 90 tablet, Rfl: 3    Multiple Vitamins-Minerals (PRESERVISION AREDS 2 PO), Take 1 tablet by mouth Daily., Disp: , Rfl:     Multiple Vitamins-Minerals (ZINC PO), Take  by mouth., Disp: , Rfl:     saccharomyces boulardii (FLORASTOR) 250 MG capsule, Take 1 capsule by mouth 2 (Two) Times a Day., Disp: , Rfl:     Allergies   Allergen Reactions    Latex Hives and Itching    Sulfa Antibiotics Itching     Social History     Tobacco Use    Smoking status: Never     Passive exposure: Never    Smokeless tobacco: Never   Substance Use Topics    Alcohol use: Never     Review of Systems   Cardiovascular:  Negative for chest pain, dyspnea on exertion, leg swelling, orthopnea, palpitations, paroxysmal nocturnal dyspnea and syncope.   Respiratory:  Negative for cough, shortness of breath and wheezing.    Musculoskeletal:  Positive for falls (None recently).   Gastrointestinal:  Negative for abdominal pain, nausea and vomiting.   Neurological:  Negative for dizziness and light-headedness.         ECG 12 Lead    Date/Time: 7/16/2025 3:57 PM  Performed by: Ad Ahmadi MD    Authorized by: Ad Ahmadi MD  Comparison: compared with previous ECG from 3/1/2024  Comparison to previous ECG: Sinus rhythm has replaced atrial fibrillation  Rhythm: sinus rhythm  Ectopy: atrial premature contractions  Rate: normal  BPM: 70  Conduction: 1st degree AV block  QRS axis: normal  Other findings: poor R wave progression    Clinical impression: abnormal EKG             Objective:     Vitals reviewed.   Constitutional:       General: Not in acute distress.     Appearance: Well-developed and not in distress.   HENT:      Head: Normocephalic and atraumatic.   Pulmonary:      Effort: Pulmonary effort is normal.      Breath sounds: Normal breath sounds. No wheezing. No rhonchi. No rales.  "  Cardiovascular:      Normal rate. Regular rhythm.      Murmurs: There is no murmur.      No gallop.    Pulses:     Intact distal pulses.   Edema:     Peripheral edema absent.   Abdominal:      General: Bowel sounds are normal. There is no distension.      Palpations: Abdomen is soft.      Tenderness: There is no abdominal tenderness.   Skin:     General: Skin is warm and dry. There is no cyanosis.      Findings: No erythema or rash.   Neurological:      Mental Status: Alert. Mental status is at baseline.       /76   Pulse 68   Ht 167.6 cm (66\")   Wt 68.5 kg (151 lb)   SpO2 97%   BMI 24.37 kg/m²     Data/Lab Review: Nothing new to review at this time        Assessment:          Diagnosis Plan   1. PAF (paroxysmal atrial fibrillation)  ECG 12 Lead      2. Primary hypertension             Plan:       1.  Paroxysmal atrial fibrillation: The patient appears to be in sinus rhythm at this time.  She has low amplitude P waves but on part of the ECG, I do feel that P waves are present.  Her heart rate is well-controlled.  She is asymptomatic.  She previously underwent Watchman device implantation due to frequent falls.  She remains on aspirin and has no significant bleeding issues.  Amiodarone has been discontinued by another provider.  I think that is reasonable to stay off the medication but the patient developed symptomatic atrial fibrillation once again, we may need to reconsider antiarrhythmic therapy.     2.  Primary hypertension: Home readings show good control of blood pressure.  Continue current medications.     The patient will follow-up in 12 months unless otherwise needed sooner.     "

## 2025-07-16 ENCOUNTER — OFFICE VISIT (OUTPATIENT)
Dept: CARDIOLOGY | Facility: CLINIC | Age: 80
End: 2025-07-16
Payer: MEDICARE

## 2025-07-16 VITALS
HEART RATE: 68 BPM | BODY MASS INDEX: 24.27 KG/M2 | WEIGHT: 151 LBS | OXYGEN SATURATION: 97 % | SYSTOLIC BLOOD PRESSURE: 132 MMHG | DIASTOLIC BLOOD PRESSURE: 76 MMHG | HEIGHT: 66 IN

## 2025-07-16 DIAGNOSIS — I10 PRIMARY HYPERTENSION: ICD-10-CM

## 2025-07-16 DIAGNOSIS — I48.0 PAF (PAROXYSMAL ATRIAL FIBRILLATION): Primary | ICD-10-CM

## 2025-07-16 PROCEDURE — 93000 ELECTROCARDIOGRAM COMPLETE: CPT | Performed by: INTERNAL MEDICINE

## 2025-07-16 PROCEDURE — 1159F MED LIST DOCD IN RCRD: CPT | Performed by: INTERNAL MEDICINE

## 2025-07-16 PROCEDURE — 3075F SYST BP GE 130 - 139MM HG: CPT | Performed by: INTERNAL MEDICINE

## 2025-07-16 PROCEDURE — 1160F RVW MEDS BY RX/DR IN RCRD: CPT | Performed by: INTERNAL MEDICINE

## 2025-07-16 PROCEDURE — 99213 OFFICE O/P EST LOW 20 MIN: CPT | Performed by: INTERNAL MEDICINE

## 2025-07-16 PROCEDURE — 3078F DIAST BP <80 MM HG: CPT | Performed by: INTERNAL MEDICINE

## 2025-07-16 RX ORDER — SACCHAROMYCES BOULARDII 250 MG
250 CAPSULE ORAL 2 TIMES DAILY
COMMUNITY

## 2025-08-20 RX ORDER — ASPIRIN 81 MG/1
81 TABLET, COATED ORAL DAILY
Qty: 30 TABLET | Refills: 11 | Status: SHIPPED | OUTPATIENT
Start: 2025-08-20

## (undated) DEVICE — Device

## (undated) DEVICE — KT NDL GUIDE STRL 18GA

## (undated) DEVICE — PAD, DEFIB, ADULT, RADIOTRANS, PHYSIO: Brand: MEDLINE

## (undated) DEVICE — ACCESS SHEATH WITH DILATOR: Brand: WATCHMAN FXD CURVE™ ACCESS SYSTEM

## (undated) DEVICE — DRAPE,ANGIO,BRACH,STERILE,38X44: Brand: MEDLINE

## (undated) DEVICE — CATH F6INF PIG 145 110CM 6SH: Brand: INFINITI

## (undated) DEVICE — CATH DIAG IMPULSE MPA1 6F 100CM

## (undated) DEVICE — SOL IRR NACL 0.9PCT BT 1000ML

## (undated) DEVICE — 1 X VERSACROSS CONNECT TRANSSEPTAL DILATOR (INCLUDING 1 X J-TIP MECHANICAL GUIDEWIRE); 1 X VERSACROSS RF WIRE (INCLUDING 1 X CONNECTOR CABLE (SINGLE USE)); 1 X DISPERSIVE ELECTRODE: Brand: VERSACROSS CONNECT LAAC ACCESS SOLUTION

## (undated) DEVICE — PINNACLE INTRODUCER SHEATH: Brand: PINNACLE

## (undated) DEVICE — SOLIDIFIER LIQUI LOC PLUS 2000CC

## (undated) DEVICE — CANN NASL ETCO2 LO/FLO A/

## (undated) DEVICE — PK CATH CARD 30 CA/4

## (undated) DEVICE — Device: Brand: MEDEX